# Patient Record
Sex: FEMALE | Race: BLACK OR AFRICAN AMERICAN | NOT HISPANIC OR LATINO | Employment: UNEMPLOYED | URBAN - METROPOLITAN AREA
[De-identification: names, ages, dates, MRNs, and addresses within clinical notes are randomized per-mention and may not be internally consistent; named-entity substitution may affect disease eponyms.]

---

## 2023-08-31 LAB
ABO GROUP (TYPE) IN BLOOD: NORMAL
ALANINE AMINOTRANSFERASE (SGPT) (U/L) IN SER/PLAS: 11 U/L (ref 7–45)
ALBUMIN (G/DL) IN SER/PLAS: 3.6 G/DL (ref 3.4–5)
ALKALINE PHOSPHATASE (U/L) IN SER/PLAS: 71 U/L (ref 33–110)
ANION GAP IN SER/PLAS: 14 MMOL/L (ref 10–20)
ANTIBODY SCREEN: NORMAL
ASPARTATE AMINOTRANSFERASE (SGOT) (U/L) IN SER/PLAS: 12 U/L (ref 9–39)
BILIRUBIN TOTAL (MG/DL) IN SER/PLAS: 0.3 MG/DL (ref 0–1.2)
CALCIUM (MG/DL) IN SER/PLAS: 9.5 MG/DL (ref 8.6–10.6)
CARBON DIOXIDE, TOTAL (MMOL/L) IN SER/PLAS: 24 MMOL/L (ref 21–32)
CHLORIDE (MMOL/L) IN SER/PLAS: 105 MMOL/L (ref 98–107)
CREATININE (MG/DL) IN SER/PLAS: 0.57 MG/DL (ref 0.5–1.05)
ERYTHROCYTE DISTRIBUTION WIDTH (RATIO) BY AUTOMATED COUNT: 16 % (ref 11.5–14.5)
ERYTHROCYTE MEAN CORPUSCULAR HEMOGLOBIN CONCENTRATION (G/DL) BY AUTOMATED: 30.2 G/DL (ref 32–36)
ERYTHROCYTE MEAN CORPUSCULAR VOLUME (FL) BY AUTOMATED COUNT: 83 FL (ref 80–100)
ERYTHROCYTES (10*6/UL) IN BLOOD BY AUTOMATED COUNT: 4.34 X10E12/L (ref 4–5.2)
ESTIMATED AVERAGE GLUCOSE FOR HBA1C: 105 MG/DL
FERRITIN, PREGNANCY: 31 UG/L
FOLATE, SERUM, PREGNANCY: 12.3 NG/ML
GFR FEMALE: >90 ML/MIN/1.73M2
GLUCOSE (MG/DL) IN SER/PLAS: 71 MG/DL (ref 74–99)
HEMATOCRIT (%) IN BLOOD BY AUTOMATED COUNT: 36.1 % (ref 36–46)
HEMOGLOBIN (G/DL) IN BLOOD: 10.9 G/DL (ref 12–16)
HEMOGLOBIN A1C/HEMOGLOBIN TOTAL IN BLOOD: 5.3 %
HEPATITIS B VIRUS SURFACE AG PRESENCE IN SERUM: NONREACTIVE
HEPATITIS C VIRUS AB PRESENCE IN SERUM: NONREACTIVE
HIV 1/ 2 AG/AB SCREEN: NONREACTIVE
IRON (UG/DL) IN SER/PLAS IN PREGNANCY: 45 UG/DL
IRON BINDING CAPACITY (UG/DL) IN PREGNANCY: 405 UG/DL
IRON SATURATION (%) IN PREGNANCY: 11 %
LEUKOCYTES (10*3/UL) IN BLOOD BY AUTOMATED COUNT: 10.5 X10E9/L (ref 4.4–11.3)
NRBC (PER 100 WBCS) BY AUTOMATED COUNT: 0 /100 WBC (ref 0–0)
PLATELETS (10*3/UL) IN BLOOD AUTOMATED COUNT: 312 X10E9/L (ref 150–450)
POTASSIUM (MMOL/L) IN SER/PLAS: 4.3 MMOL/L (ref 3.5–5.3)
PROTEIN TOTAL: 6.6 G/DL (ref 6.4–8.2)
REFLEX ADDED, ANEMIA PANEL: ABNORMAL
RH FACTOR: NORMAL
RUBELLA VIRUS IGG AB: POSITIVE
SODIUM (MMOL/L) IN SER/PLAS: 139 MMOL/L (ref 136–145)
SYPHILIS TOTAL AB: NONREACTIVE
UREA NITROGEN (MG/DL) IN SER/PLAS: 8 MG/DL (ref 6–23)
VITAMIN B12, PREGNANCY: 340 PG/ML

## 2023-09-02 LAB
CHLAMYDIA TRACH., AMPLIFIED: NEGATIVE
N. GONORRHEA, AMPLIFIED: NEGATIVE
URINE CULTURE: ABNORMAL

## 2023-09-07 LAB
CF RESULTS: NORMAL
SMA RESULT: NORMAL

## 2023-09-19 ENCOUNTER — TRANSCRIBE ORDERS (OUTPATIENT)
Dept: OBSTETRICS AND GYNECOLOGY | Facility: CLINIC | Age: 23
End: 2023-09-19
Payer: COMMERCIAL

## 2023-09-19 DIAGNOSIS — Z03.74 SUSPECTED PROBLEM WITH FETAL GROWTH NOT FOUND: Primary | ICD-10-CM

## 2023-09-28 LAB — URINE CULTURE: NORMAL

## 2023-10-26 PROBLEM — D50.9 ANEMIA, IRON DEFICIENCY: Status: ACTIVE | Noted: 2023-10-26

## 2023-10-26 PROBLEM — E55.9 VITAMIN D DEFICIENCY: Status: ACTIVE | Noted: 2023-10-26

## 2023-10-26 PROBLEM — L70.9 ACNE: Status: ACTIVE | Noted: 2023-10-26

## 2023-10-26 PROBLEM — O09.299 HISTORY OF PRE-ECLAMPSIA IN PRIOR PREGNANCY, CURRENTLY PREGNANT (HHS-HCC): Status: ACTIVE | Noted: 2023-10-26

## 2023-10-26 PROBLEM — M25.562 KNEE PAIN, LEFT: Status: ACTIVE | Noted: 2023-10-26

## 2023-10-26 PROBLEM — R10.9 ABDOMINAL PAIN: Status: ACTIVE | Noted: 2023-10-26

## 2023-10-26 PROBLEM — O99.210 OBESITY IN PREGNANCY (HHS-HCC): Status: ACTIVE | Noted: 2023-10-26

## 2023-10-26 PROBLEM — O09.299 HISTORY OF SHOULDER DYSTOCIA IN PRIOR PREGNANCY, CURRENTLY PREGNANT (HHS-HCC): Status: ACTIVE | Noted: 2023-10-26

## 2023-10-26 PROBLEM — K21.9 GERD (GASTROESOPHAGEAL REFLUX DISEASE): Status: ACTIVE | Noted: 2023-10-26

## 2023-10-26 PROBLEM — R73.09 ELEVATED GLUCOSE: Status: ACTIVE | Noted: 2023-10-26

## 2023-10-26 PROBLEM — K59.00 CONSTIPATION: Status: ACTIVE | Noted: 2023-10-26

## 2023-10-26 PROBLEM — F41.1 GENERALIZED ANXIETY DISORDER: Status: ACTIVE | Noted: 2023-10-26

## 2023-10-26 PROBLEM — O09.41 HIGH RISK MULTIGRAVIDA, FIRST TRIMESTER (HHS-HCC): Status: ACTIVE | Noted: 2023-10-26

## 2023-10-26 RX ORDER — TRETINOIN 0.5 MG/G
CREAM TOPICAL NIGHTLY
COMMUNITY
Start: 2022-03-24 | End: 2023-12-05 | Stop reason: HOSPADM

## 2023-10-26 RX ORDER — ASPIRIN 325 MG
1 TABLET, DELAYED RELEASE (ENTERIC COATED) ORAL
COMMUNITY
Start: 2021-03-30 | End: 2023-12-05 | Stop reason: HOSPADM

## 2023-10-26 RX ORDER — BENZOYL PEROXIDE 5 G/100G
1 GEL TOPICAL 2 TIMES DAILY
COMMUNITY
Start: 2022-03-24 | End: 2023-12-05 | Stop reason: HOSPADM

## 2023-10-26 RX ORDER — ONDANSETRON 4 MG/1
4 TABLET, ORALLY DISINTEGRATING ORAL EVERY 6 HOURS PRN
COMMUNITY
End: 2023-12-05 | Stop reason: HOSPADM

## 2023-10-27 ENCOUNTER — APPOINTMENT (OUTPATIENT)
Dept: OBSTETRICS AND GYNECOLOGY | Facility: CLINIC | Age: 23
End: 2023-10-27
Payer: COMMERCIAL

## 2023-10-30 ASSESSMENT — EDINBURGH POSTNATAL DEPRESSION SCALE (EPDS)
I HAVE FELT SCARED OR PANICKY FOR NO GOOD REASON: YES, SOMETIMES
I HAVE FELT SAD OR MISERABLE: NOT VERY OFTEN
I HAVE BLAMED MYSELF UNNECESSARILY WHEN THINGS WENT WRONG: NOT VERY OFTEN
I HAVE BEEN ANXIOUS OR WORRIED FOR NO GOOD REASON: YES, SOMETIMES
THINGS HAVE BEEN GETTING ON TOP OF ME: NO, MOST OF THE TIME I HAVE COPED QUITE WELL
I HAVE BEEN ABLE TO LAUGH AND SEE THE FUNNY SIDE OF THINGS: AS MUCH AS I ALWAYS COULD
I HAVE BEEN SO UNHAPPY THAT I HAVE HAD DIFFICULTY SLEEPING: NOT AT ALL
THE THOUGHT OF HARMING MYSELF HAS OCCURRED TO ME: NEVER
I HAVE BEEN SO UNHAPPY THAT I HAVE BEEN CRYING: ONLY OCCASIONALLY
I HAVE LOOKED FORWARD WITH ENJOYMENT TO THINGS: AS MUCH AS I EVER DID
TOTAL SCORE: 8

## 2023-11-21 ENCOUNTER — APPOINTMENT (OUTPATIENT)
Dept: OBSTETRICS AND GYNECOLOGY | Facility: CLINIC | Age: 23
End: 2023-11-21
Payer: COMMERCIAL

## 2023-12-05 ENCOUNTER — ROUTINE PRENATAL (OUTPATIENT)
Dept: OBSTETRICS AND GYNECOLOGY | Facility: CLINIC | Age: 23
End: 2023-12-05
Payer: COMMERCIAL

## 2023-12-05 VITALS
WEIGHT: 293 LBS | BODY MASS INDEX: 52.78 KG/M2 | HEART RATE: 98 BPM | DIASTOLIC BLOOD PRESSURE: 80 MMHG | SYSTOLIC BLOOD PRESSURE: 117 MMHG

## 2023-12-05 DIAGNOSIS — O09.299 HISTORY OF SHOULDER DYSTOCIA IN PRIOR PREGNANCY, CURRENTLY PREGNANT (HHS-HCC): ICD-10-CM

## 2023-12-05 DIAGNOSIS — O09.299 HISTORY OF PRE-ECLAMPSIA IN PRIOR PREGNANCY, CURRENTLY PREGNANT (HHS-HCC): ICD-10-CM

## 2023-12-05 DIAGNOSIS — Z3A.31 31 WEEKS GESTATION OF PREGNANCY (HHS-HCC): Primary | ICD-10-CM

## 2023-12-05 PROBLEM — U07.1 COVID-19: Status: ACTIVE | Noted: 2021-09-21

## 2023-12-05 PROBLEM — R03.0 ELEVATED BLOOD-PRESSURE READING WITHOUT DIAGNOSIS OF HYPERTENSION: Status: ACTIVE | Noted: 2021-09-21

## 2023-12-05 PROCEDURE — 86780 TREPONEMA PALLIDUM: CPT

## 2023-12-05 PROCEDURE — 82947 ASSAY GLUCOSE BLOOD QUANT: CPT

## 2023-12-05 PROCEDURE — 82607 VITAMIN B-12: CPT

## 2023-12-05 PROCEDURE — 83550 IRON BINDING TEST: CPT

## 2023-12-05 PROCEDURE — 99214 OFFICE O/P EST MOD 30 MIN: CPT | Performed by: ADVANCED PRACTICE MIDWIFE

## 2023-12-05 PROCEDURE — 85027 COMPLETE CBC AUTOMATED: CPT

## 2023-12-05 PROCEDURE — 99214 OFFICE O/P EST MOD 30 MIN: CPT | Mod: TH | Performed by: ADVANCED PRACTICE MIDWIFE

## 2023-12-05 PROCEDURE — 82728 ASSAY OF FERRITIN: CPT

## 2023-12-05 PROCEDURE — 82746 ASSAY OF FOLIC ACID SERUM: CPT

## 2023-12-05 ASSESSMENT — PAIN - FUNCTIONAL ASSESSMENT: PAIN_FUNCTIONAL_ASSESSMENT: 0-10

## 2023-12-05 ASSESSMENT — PAIN SCALES - GENERAL: PAINLEVEL_OUTOF10: 0 - NO PAIN

## 2023-12-05 NOTE — PROGRESS NOTES
Assessment/Plan   Problem List Items Addressed This Visit             ICD-10-CM       Ob-Gyn Problems    History of pre-eclampsia in prior pregnancy, currently pregnant O09.299    Relevant Orders    CBC Anemia Panel With Reflex,Pregnancy    Syphilis Screen with Reflex    Glucose, 1 Hour Screen, Pregnancy    History of shoulder dystocia in prior pregnancy, currently pregnant O09.299     Reviewed etiology of shoulder dystocia  Occurrence in approximately 0.2-3% of vaginal deliveries  Discussed it is difficult to predict who may have a shoulder dystocia, however risk factors include:  diabetes in pregnancy (babies are more likely to have macrosomia; show a pattern of greater shoulder/chest/abdominal growth)  fetal macrosomia (3-13% of newborns weighing >4000g)  excessive weight gain in pregnancy (associated with increased rates of fetal macrosomia)  prior history of shoulder dystocia (10-12% recurrence rate)  Discussed maternal and  risks reviewed including: lacerations, PS separation,  brachial plexus injury, fractures to humerus or clavicle, HIE, rarely  death (1 in 25,000 deliveries)  Reviewed weight gain (30); 1hr glucose (pending)  Discussed options/risks/benefits of pLTCS vs. vaginal delivery  Patient acknowledges understanding          Relevant Orders    CBC Anemia Panel With Reflex,Pregnancy    Syphilis Screen with Reflex    Glucose, 1 Hour Screen, Pregnancy     Other Visit Diagnoses         Codes    31 weeks gestation of pregnancy    -  Primary Z3A.31    Relevant Orders    CBC Anemia Panel With Reflex,Pregnancy    Syphilis Screen with Reflex    Glucose, 1 Hour Screen, Pregnancy             surveillance weekly for 34wk  Needs to schedule growth US ASAP  Extensively counseled on risk of shoulder with previous LGA baby  -patient unsure would benefit from counseling again    -discussed MD care only due to BMI    Reviewed s/sx of PTL labor, warning signs, fetal movement counts, and  "when to call provider  Follow up in 2 week for a routine prenatal visit.    Laquita Avalos, LUIS MANUEL-ALLEN    Subjective     Darcie Monroe is a 23 y.o.  at 31w4d with a working estimated date of delivery of 2024, by Last Menstrual Period who presents for a routine prenatal visit. She denies vaginal bleeding, leakage of fluid, decreased fetal movements, or contractions.    Her pregnancy is complicated by:  Pregnancy Problems (from 24 to present)       No problems associated with this episode.             Objective   Physical Exam:   Weight: 148 kg (327 lb)  Expected Total Weight Gain: Could not be calculated   Pregravid BMI: Could not be calculated  BP: 117/80  Fetal Heart Rate: 145               Postpartum Depression: Medium Risk (10/30/2023)    Gay  Depression Scale     Last EPDS Total Score: 8     Last EPDS Self Harm Result: Never        Prenatal Labs  Lab Results   Component Value Date    HGB 10.9 (L) 2023    HCT 36.1 2023    ABO A 2023    HEPBSAG NONREACTIVE 2023     No results found for: \"GLUF\", \"GLUT1\", \"LESONMY9QE\", \"FLGMPAV8DH\"  No results found for: \"GBS\"     Imaging  The most recent ultrasound was performed on The most recent ultrasound study is not finalized with a study GA of The most recent ultrasound study is not finalized and EFW of The most recent ultrasound study is not finalized.  The most recent ultrasound study is not finalized  The most recent ultrasound study is not finalized  "

## 2023-12-05 NOTE — ASSESSMENT & PLAN NOTE
Reviewed etiology of shoulder dystocia  Occurrence in approximately 0.2-3% of vaginal deliveries  Discussed it is difficult to predict who may have a shoulder dystocia, however risk factors include:  diabetes in pregnancy (babies are more likely to have macrosomia; show a pattern of greater shoulder/chest/abdominal growth)  fetal macrosomia (3-13% of newborns weighing >4000g)  excessive weight gain in pregnancy (associated with increased rates of fetal macrosomia)  prior history of shoulder dystocia (10-12% recurrence rate)  Discussed maternal and  risks reviewed including: lacerations, PS separation,  brachial plexus injury, fractures to humerus or clavicle, HIE, rarely  death (1 in 25,000 deliveries)  Reviewed weight gain (30); 1hr glucose (pending)  Discussed options/risks/benefits of pLTCS vs. vaginal delivery  Patient acknowledges understanding

## 2023-12-06 ENCOUNTER — TELEPHONE (OUTPATIENT)
Dept: PEDIATRICS | Facility: CLINIC | Age: 23
End: 2023-12-06
Payer: COMMERCIAL

## 2023-12-06 DIAGNOSIS — R73.09 ELEVATED GLUCOSE LEVEL: ICD-10-CM

## 2023-12-06 DIAGNOSIS — O99.013 ANEMIA IN PREGNANCY, THIRD TRIMESTER (HHS-HCC): Primary | ICD-10-CM

## 2023-12-06 RX ORDER — FERROUS SULFATE 325(65) MG
325 TABLET ORAL 2 TIMES DAILY
Qty: 30 TABLET | Refills: 2 | Status: SHIPPED | OUTPATIENT
Start: 2023-12-06 | End: 2024-12-05

## 2023-12-06 NOTE — TELEPHONE ENCOUNTER
SW received referral from Laquita Avalos CNM to that pt needs resources for transportation. SW called pt, Darcie Monroe ([phone]) and introduced self and explained reason for today's call. Pt confirmed transportation is a barrier. SW will call a [employer] to bring pt to next appointment on 12/19/23 for ultrasound and OBGYN. SW explained Provide A Ride through pt's insurance [employer] and how she can schedule transportation for her future appointments. SW assessed for additional needs. Pt familiar with [employer] and [employer] provided contact info to inquire about baby items, pack n play, and a car seat. Pt shared she was not working and her utility bill balance is high. SW provided contact info for [employer] and [employer]. No other SW needs at this time. [employer] will call pt on 12/19/23 before calling [employer] to confirm. SW contact info was provided if additional needs arise.

## 2023-12-19 ENCOUNTER — APPOINTMENT (OUTPATIENT)
Dept: OBSTETRICS AND GYNECOLOGY | Facility: CLINIC | Age: 23
End: 2023-12-19
Payer: COMMERCIAL

## 2023-12-19 ENCOUNTER — ANCILLARY PROCEDURE (OUTPATIENT)
Dept: RADIOLOGY | Facility: CLINIC | Age: 23
End: 2023-12-19
Payer: COMMERCIAL

## 2023-12-19 ENCOUNTER — ROUTINE PRENATAL (OUTPATIENT)
Dept: OBSTETRICS AND GYNECOLOGY | Facility: CLINIC | Age: 23
End: 2023-12-19
Payer: COMMERCIAL

## 2023-12-19 VITALS — DIASTOLIC BLOOD PRESSURE: 84 MMHG | WEIGHT: 293 LBS | BODY MASS INDEX: 52.59 KG/M2 | SYSTOLIC BLOOD PRESSURE: 127 MMHG

## 2023-12-19 DIAGNOSIS — Z03.74 SUSPECTED PROBLEM WITH FETAL GROWTH NOT FOUND: ICD-10-CM

## 2023-12-19 DIAGNOSIS — O99.213 OBESITY COMPLICATING PREGNANCY, THIRD TRIMESTER (HHS-HCC): ICD-10-CM

## 2023-12-19 DIAGNOSIS — Z3A.33 33 WEEKS GESTATION OF PREGNANCY (HHS-HCC): ICD-10-CM

## 2023-12-19 DIAGNOSIS — O99.210 OBESITY IN PREGNANCY (HHS-HCC): ICD-10-CM

## 2023-12-19 DIAGNOSIS — O09.43 HIGH RISK MULTIGRAVIDA IN THIRD TRIMESTER (HHS-HCC): ICD-10-CM

## 2023-12-19 DIAGNOSIS — O99.013 ANEMIA DURING PREGNANCY IN THIRD TRIMESTER (HHS-HCC): ICD-10-CM

## 2023-12-19 DIAGNOSIS — R73.09 ELEVATED GLUCOSE LEVEL: ICD-10-CM

## 2023-12-19 DIAGNOSIS — K21.9 GASTROESOPHAGEAL REFLUX DISEASE, UNSPECIFIED WHETHER ESOPHAGITIS PRESENT: Primary | ICD-10-CM

## 2023-12-19 PROCEDURE — 76819 FETAL BIOPHYS PROFIL W/O NST: CPT

## 2023-12-19 PROCEDURE — 76819 FETAL BIOPHYS PROFIL W/O NST: CPT | Performed by: OBSTETRICS & GYNECOLOGY

## 2023-12-19 PROCEDURE — 90715 TDAP VACCINE 7 YRS/> IM: CPT | Mod: GC

## 2023-12-19 PROCEDURE — 99213 OFFICE O/P EST LOW 20 MIN: CPT | Mod: GC,TH

## 2023-12-19 PROCEDURE — 76816 OB US FOLLOW-UP PER FETUS: CPT

## 2023-12-19 PROCEDURE — 76816 OB US FOLLOW-UP PER FETUS: CPT | Performed by: OBSTETRICS & GYNECOLOGY

## 2023-12-19 PROCEDURE — 99213 OFFICE O/P EST LOW 20 MIN: CPT

## 2023-12-19 RX ORDER — FAMOTIDINE 20 MG/1
20 TABLET, FILM COATED ORAL 2 TIMES DAILY
Qty: 90 TABLET | Refills: 2 | Status: SHIPPED | OUTPATIENT
Start: 2023-12-19 | End: 2024-04-24 | Stop reason: WASHOUT

## 2023-12-19 NOTE — ASSESSMENT & PLAN NOTE
Not taking iron. Discussed taking iron every other day. Patient expressed understanding. Repeat CBC at next visit

## 2023-12-19 NOTE — PROGRESS NOTES
Ob Follow-up  23     SUBJECTIVE      HPI: Darcie Monroe is a 23 y.o.  at 33w4d here for RPNV.  She has mild contractions, no bleeding, or no LOF. Reports normal fetal movement. Patient reports some dizziness.       OBJECTIVE  Visit Vitals  /84   Wt 148 kg (325 lb 12.8 oz)   LMP 2023   BMI 52.59 kg/m²   OB Status Pregnant   Smoking Status Never   BSA 2.62 m²      FHT: US today      ASSESSMENT & PLAN    Darcie Monroe is a 23 y.o.  at 33w4d here for the following concerns we addressed today:    Problem List Items Addressed This Visit          Pregnancy    33 weeks gestation of pregnancy    Anemia during pregnancy in third trimester    Overview     Hgb 10.1  Iron Rxed         Current Assessment & Plan     Not taking iron. Discussed taking iron every other day. Patient expressed understanding. Repeat CBC at next visit         Elevated glucose level    Overview     1hr 145-   3hr ordered          Current Assessment & Plan     Discussed recommendation for 3 hr. Ordered. Patient expressed understanding         GERD (gastroesophageal reflux disease) - Primary    Current Assessment & Plan     Feels omeprazole not working, pepcid rxed         Relevant Medications    famotidine (Pepcid) 20 mg tablet    High risk multigravida, first trimester    Overview     Dating:   Baby girl [place]  [] Initial BMI:   [x] Prenatal Labs:   [] Aneuploidy Screening:    [x] Baby ASA:  [x] Anatomy US:  [x] 1hr GCT at 24-28wks: elevated   [] 3 hr - ordered, not done  [x] Tdap (27-36wks):   [x] Flu Shot: declined   [x] COVID vaccine: declined   [x] Rhogam (if Rh neg):  not inidicated  [] GBS at 36 wks:  [] Breastfeeding  [] PPBC:   [] 39 weeks discussion of IOL vs. Expectant management: hx of shoulder dystocia, 9#6oz, *discuss risks/method of delivery at 35wk visit*  [] Mode of delivery:          Current Assessment & Plan     3 hr ordered- patient to go to lab over next week. Tdap today. Declined  flu/covid  Otherwise up to date         Obesity in pregnancy    Overview     MD CARE ONLY          Current Assessment & Plan     Growth US today. Repeat at 36 weeks              Orders Placed This Encounter   Procedures    Tdap vaccine, age 7 years and older  (BOOSTRIX)        RTC in 2 weeks  Discussed with Dr. Loraine Gonzalez MD

## 2023-12-19 NOTE — ASSESSMENT & PLAN NOTE
3 hr ordered- patient to go to lab over next week. Tdap today. Declined flu/covid  Otherwise up to date

## 2023-12-19 NOTE — ASSESSMENT & PLAN NOTE
3 hr ordered- patient to go to lab over next week. Tdap today. Declined flu/covid  Otherwise up to date.

## 2023-12-21 ENCOUNTER — TELEPHONE (OUTPATIENT)
Dept: OBSTETRICS AND GYNECOLOGY | Facility: HOSPITAL | Age: 23
End: 2023-12-21
Payer: COMMERCIAL

## 2023-12-26 ENCOUNTER — TELEPHONE (OUTPATIENT)
Dept: OBSTETRICS AND GYNECOLOGY | Facility: HOSPITAL | Age: 23
End: 2023-12-26
Payer: COMMERCIAL

## 2023-12-29 ENCOUNTER — HOSPITAL ENCOUNTER (OUTPATIENT)
Facility: HOSPITAL | Age: 23
End: 2023-12-29
Attending: OBSTETRICS & GYNECOLOGY | Admitting: OBSTETRICS & GYNECOLOGY
Payer: COMMERCIAL

## 2023-12-29 ENCOUNTER — HOSPITAL ENCOUNTER (OUTPATIENT)
Facility: HOSPITAL | Age: 23
Discharge: HOME | End: 2023-12-29
Attending: OBSTETRICS & GYNECOLOGY | Admitting: OBSTETRICS & GYNECOLOGY
Payer: COMMERCIAL

## 2023-12-29 VITALS
BODY MASS INDEX: 48.82 KG/M2 | DIASTOLIC BLOOD PRESSURE: 74 MMHG | OXYGEN SATURATION: 100 % | TEMPERATURE: 97.9 F | SYSTOLIC BLOOD PRESSURE: 127 MMHG | RESPIRATION RATE: 18 BRPM | HEART RATE: 94 BPM | WEIGHT: 293 LBS | HEIGHT: 65 IN

## 2023-12-29 LAB
ALBUMIN SERPL BCP-MCNC: 3.4 G/DL (ref 3.4–5)
ALP SERPL-CCNC: 154 U/L (ref 33–110)
ALT SERPL W P-5'-P-CCNC: 11 U/L (ref 7–45)
ANION GAP SERPL CALC-SCNC: 15 MMOL/L (ref 10–20)
AST SERPL W P-5'-P-CCNC: 12 U/L (ref 9–39)
BILIRUB SERPL-MCNC: 0.3 MG/DL (ref 0–1.2)
BUN SERPL-MCNC: 6 MG/DL (ref 6–23)
CALCIUM SERPL-MCNC: 8.9 MG/DL (ref 8.6–10.6)
CHLORIDE SERPL-SCNC: 108 MMOL/L (ref 98–107)
CO2 SERPL-SCNC: 20 MMOL/L (ref 21–32)
CREAT SERPL-MCNC: 0.51 MG/DL (ref 0.5–1.05)
CREAT UR-MCNC: 111.9 MG/DL (ref 20–320)
ERYTHROCYTE [DISTWIDTH] IN BLOOD BY AUTOMATED COUNT: 14.7 % (ref 11.5–14.5)
GFR SERPL CREATININE-BSD FRML MDRD: >90 ML/MIN/1.73M*2
GLUCOSE BLD MANUAL STRIP-MCNC: 111 MG/DL (ref 74–99)
GLUCOSE SERPL-MCNC: 95 MG/DL (ref 74–99)
HCT VFR BLD AUTO: 32.6 % (ref 36–46)
HGB BLD-MCNC: 9.9 G/DL (ref 12–16)
MCH RBC QN AUTO: 24.1 PG (ref 26–34)
MCHC RBC AUTO-ENTMCNC: 30.4 G/DL (ref 32–36)
MCV RBC AUTO: 79 FL (ref 80–100)
NRBC BLD-RTO: 0 /100 WBCS (ref 0–0)
PLATELET # BLD AUTO: 305 X10*3/UL (ref 150–450)
POTASSIUM SERPL-SCNC: 4.2 MMOL/L (ref 3.5–5.3)
PROT SERPL-MCNC: 6.3 G/DL (ref 6.4–8.2)
PROT UR-ACNC: 16 MG/DL (ref 5–24)
PROT/CREAT UR: 0.14 MG/MG CREAT (ref 0–0.17)
RBC # BLD AUTO: 4.11 X10*6/UL (ref 4–5.2)
SODIUM SERPL-SCNC: 139 MMOL/L (ref 136–145)
WBC # BLD AUTO: 12.8 X10*3/UL (ref 4.4–11.3)

## 2023-12-29 PROCEDURE — 36415 COLL VENOUS BLD VENIPUNCTURE: CPT | Mod: 59

## 2023-12-29 PROCEDURE — 99214 OFFICE O/P EST MOD 30 MIN: CPT | Mod: 25

## 2023-12-29 PROCEDURE — 82570 ASSAY OF URINE CREATININE: CPT | Performed by: STUDENT IN AN ORGANIZED HEALTH CARE EDUCATION/TRAINING PROGRAM

## 2023-12-29 PROCEDURE — 99213 OFFICE O/P EST LOW 20 MIN: CPT | Performed by: STUDENT IN AN ORGANIZED HEALTH CARE EDUCATION/TRAINING PROGRAM

## 2023-12-29 PROCEDURE — 82947 ASSAY GLUCOSE BLOOD QUANT: CPT | Mod: 59

## 2023-12-29 PROCEDURE — 85027 COMPLETE CBC AUTOMATED: CPT | Performed by: STUDENT IN AN ORGANIZED HEALTH CARE EDUCATION/TRAINING PROGRAM

## 2023-12-29 PROCEDURE — 80053 COMPREHEN METABOLIC PANEL: CPT | Performed by: STUDENT IN AN ORGANIZED HEALTH CARE EDUCATION/TRAINING PROGRAM

## 2023-12-29 PROCEDURE — 36415 COLL VENOUS BLD VENIPUNCTURE: CPT | Performed by: STUDENT IN AN ORGANIZED HEALTH CARE EDUCATION/TRAINING PROGRAM

## 2023-12-29 RX ORDER — NIFEDIPINE 10 MG/1
10 CAPSULE ORAL ONCE AS NEEDED
Status: DISCONTINUED | OUTPATIENT
Start: 2023-12-29 | End: 2023-12-30 | Stop reason: HOSPADM

## 2023-12-29 RX ORDER — ONDANSETRON HYDROCHLORIDE 2 MG/ML
4 INJECTION, SOLUTION INTRAVENOUS EVERY 6 HOURS PRN
Status: DISCONTINUED | OUTPATIENT
Start: 2023-12-29 | End: 2023-12-30 | Stop reason: HOSPADM

## 2023-12-29 RX ORDER — ONDANSETRON 4 MG/1
4 TABLET, FILM COATED ORAL EVERY 6 HOURS PRN
Status: DISCONTINUED | OUTPATIENT
Start: 2023-12-29 | End: 2023-12-30 | Stop reason: HOSPADM

## 2023-12-29 RX ORDER — LABETALOL HYDROCHLORIDE 5 MG/ML
20 INJECTION, SOLUTION INTRAVENOUS ONCE AS NEEDED
Status: DISCONTINUED | OUTPATIENT
Start: 2023-12-29 | End: 2023-12-30 | Stop reason: HOSPADM

## 2023-12-29 RX ORDER — HYDRALAZINE HYDROCHLORIDE 20 MG/ML
5 INJECTION INTRAMUSCULAR; INTRAVENOUS ONCE AS NEEDED
Status: DISCONTINUED | OUTPATIENT
Start: 2023-12-29 | End: 2023-12-30 | Stop reason: HOSPADM

## 2023-12-29 RX ORDER — LIDOCAINE HYDROCHLORIDE 10 MG/ML
0.5 INJECTION INFILTRATION; PERINEURAL ONCE AS NEEDED
Status: DISCONTINUED | OUTPATIENT
Start: 2023-12-29 | End: 2023-12-30 | Stop reason: HOSPADM

## 2023-12-29 SDOH — SOCIAL STABILITY: SOCIAL INSECURITY: ARE THERE ANY APPARENT SIGNS OF INJURIES/BEHAVIORS THAT COULD BE RELATED TO ABUSE/NEGLECT?: NO

## 2023-12-29 SDOH — ECONOMIC STABILITY: HOUSING INSECURITY: DO YOU FEEL UNSAFE GOING BACK TO THE PLACE WHERE YOU ARE LIVING?: NO

## 2023-12-29 SDOH — HEALTH STABILITY: MENTAL HEALTH: NON-SPECIFIC ACTIVE SUICIDAL THOUGHTS (PAST 1 MONTH): NO

## 2023-12-29 SDOH — HEALTH STABILITY: MENTAL HEALTH: SUICIDAL BEHAVIOR (LIFETIME): NO

## 2023-12-29 SDOH — SOCIAL STABILITY: SOCIAL INSECURITY: PHYSICAL ABUSE: DENIES

## 2023-12-29 SDOH — SOCIAL STABILITY: SOCIAL INSECURITY: ABUSE SCREEN: ADULT

## 2023-12-29 SDOH — SOCIAL STABILITY: SOCIAL INSECURITY: DO YOU FEEL ANYONE HAS EXPLOITED OR TAKEN ADVANTAGE OF YOU FINANCIALLY OR OF YOUR PERSONAL PROPERTY?: NO

## 2023-12-29 SDOH — SOCIAL STABILITY: SOCIAL INSECURITY: HAS ANYONE EVER THREATENED TO HURT YOUR FAMILY OR YOUR PETS?: NO

## 2023-12-29 SDOH — SOCIAL STABILITY: SOCIAL INSECURITY: ARE YOU OR HAVE YOU BEEN THREATENED OR ABUSED PHYSICALLY, EMOTIONALLY, OR SEXUALLY BY ANYONE?: NO

## 2023-12-29 SDOH — HEALTH STABILITY: MENTAL HEALTH: WISH TO BE DEAD (PAST 1 MONTH): NO

## 2023-12-29 SDOH — SOCIAL STABILITY: SOCIAL INSECURITY: HAVE YOU HAD THOUGHTS OF HARMING ANYONE ELSE?: NO

## 2023-12-29 SDOH — HEALTH STABILITY: MENTAL HEALTH: WERE YOU ABLE TO COMPLETE ALL THE BEHAVIORAL HEALTH SCREENINGS?: YES

## 2023-12-29 SDOH — SOCIAL STABILITY: SOCIAL INSECURITY: VERBAL ABUSE: DENIES

## 2023-12-29 SDOH — SOCIAL STABILITY: SOCIAL INSECURITY: DOES ANYONE TRY TO KEEP YOU FROM HAVING/CONTACTING OTHER FRIENDS OR DOING THINGS OUTSIDE YOUR HOME?: NO

## 2023-12-29 ASSESSMENT — LIFESTYLE VARIABLES
AUDIT-C TOTAL SCORE: 0
AUDIT-C TOTAL SCORE: 0
SKIP TO QUESTIONS 9-10: 1
HOW MANY STANDARD DRINKS CONTAINING ALCOHOL DO YOU HAVE ON A TYPICAL DAY: PATIENT DOES NOT DRINK
HOW OFTEN DO YOU HAVE 6 OR MORE DRINKS ON ONE OCCASION: NEVER
HOW OFTEN DO YOU HAVE A DRINK CONTAINING ALCOHOL: NEVER

## 2023-12-29 ASSESSMENT — PATIENT HEALTH QUESTIONNAIRE - PHQ9
SUM OF ALL RESPONSES TO PHQ9 QUESTIONS 1 & 2: 0
1. LITTLE INTEREST OR PLEASURE IN DOING THINGS: NOT AT ALL
2. FEELING DOWN, DEPRESSED OR HOPELESS: NOT AT ALL

## 2023-12-29 ASSESSMENT — COLUMBIA-SUICIDE SEVERITY RATING SCALE - C-SSRS
6. HAVE YOU EVER DONE ANYTHING, STARTED TO DO ANYTHING, OR PREPARED TO DO ANYTHING TO END YOUR LIFE?: NO
1. IN THE PAST MONTH, HAVE YOU WISHED YOU WERE DEAD OR WISHED YOU COULD GO TO SLEEP AND NOT WAKE UP?: NO
2. HAVE YOU ACTUALLY HAD ANY THOUGHTS OF KILLING YOURSELF?: NO

## 2023-12-29 ASSESSMENT — PAIN SCALES - GENERAL: PAINLEVEL_OUTOF10: 3

## 2023-12-30 NOTE — H&P
Obstetrical Admission History and Physical    Assessment/Plan    Darcie Monroe is a 23 y.o.  at 35w0d who presents for elevated Bps at home, decreased fetal movement, and pelvic pressure.    R/o PEC  - Bps cycled and all normotensive   - Discussed RN visit in the next week to calibrate home cuff  - Asymptomatic, PEC labs neg, P:C 0.14    Pelvic pressure  - Closed/long/high  - [place] quiet    Decreased fetal movement  - Improved in triage  - NST reactive and reassuring  - Bedside US FRANCI: 15, MVP 4.5    Dispo: home with precautions    Dalia Mills MD  Seen and d/w Dr. Byers    Subjective   24 yo  at 35.0 who presents for elevated Bps at home, decreased fetal movement, and pelvic pressure.    Monitoring Bps at home due to history of PEC and over last few days have been rising 150-160s. No headache, changes in vision, RUQ pain. Also noticing some progressing pelvic pressure over the last week and cramping. No LOF, VB. Fetal movement decreased x 2 days. Improved in triage to baseline.     Pregnancy c/b:  - h/o PEC  - h/o shoulder dystocia of 9 lb 6 oz baby  - Class III obesity  - abnormal 1hr, no 3 hr gtt yet  - iron deficiency anemia    Obstetrical History   OB History    Para Term  AB Living   2 1 1 0 0 1   SAB IAB Ectopic Multiple Live Births   0 0 0 0 1      # Outcome Date GA Lbr Marcelino/2nd Weight Sex Delivery Anes PTL Lv   2 Current            1 Term 20 39w1d  4252 g M Vag-Spont EPI N LUIS ANTONIO      Obstetric Comments   2023 1:19 PM - SM  Order for Compression stockings sent to [facility] Byron Chapa RN        2023 3:27 PM - MS  rx for breast pump sent to [facility]       2023 12:09 PM - SM  [facility]        Past Medical History  Past Medical History:   Diagnosis Date    11 weeks gestation of pregnancy 10/14/2019    11 weeks gestation of pregnancy    Abnormal findings on diagnostic imaging of heart and  coronary circulation 06/28/2018    Abnormal echocardiogram    Acanthosis nigricans 08/24/2017    Acanthosis nigricans    Acute candidiasis of vulva and vagina 06/07/2017    Vaginitis due to Candida    Acute candidiasis of vulva and vagina 09/23/2019    Vaginal candida    Acute vaginitis 09/23/2019    Bacterial vaginosis    Anemia complicating pregnancy, third trimester 03/23/2020    Anemia of pregnancy in third trimester    Body mass index (BMI) 50.0-59.9, adult (CMS/[facility]) 10/14/2019    Body mass index (BMI) of 50.0 to 59.9 in adult    Body mass index (BMI) pediatric, greater than or equal to 95th percentile for age 06/25/2018    BMI (body mass index), pediatric, greater than or equal to 95% for age    Cannabis use, unspecified, uncomplicated 02/04/2016    Marijuana smoker    Cannabis use, unspecified, uncomplicated 02/04/2016    Marijuana smoker    Chlamydial infection, unspecified 01/14/2016    Chlamydial infection    Conduct disorder, unspecified     Disruptive behavior disorder    Constipation during pregnancy 08/31/2023    Dr Higgins    Encounter for contraceptive management, unspecified 06/30/2017    Contraception management    Encounter for gynecological examination (general) (routine) without abnormal findings 03/19/2021    Well woman exam    Encounter for immunization 01/14/2016    Immunization due    Encounter for immunization 10/09/2018    Immunization due    Encounter for pregnancy test, result positive 09/23/2019    Positive urine pregnancy test    Encounter for routine child health examination with abnormal findings 09/12/2019    Encounter for well adolescent visit with abnormal findings    Encounter for routine child health examination with abnormal findings 06/25/2018    Encounter for routine child health examination with abnormal findings    Encounter for screening for infections with a predominantly sexual mode of transmission 06/25/2018    Routine screening for STI (sexually transmitted infection)     GERD (gastroesophageal reflux disease) 08/31/2023    Dr Higgins    Heart disease, unspecified 06/28/2018    Left ventricular dysfunction    High risk heterosexual behavior 01/14/2016    Sexually active child    History of pre-eclampsia     Iron deficiency anemia during pregnancy 08/31/2023    Dr Higgins    Irregular menstruation, unspecified 09/13/2019    Missed menses    Localized edema 06/25/2018    Edema extremities    Migraine, unspecified, not intractable, without status migrainosus 11/19/2018    Headache, migraine    Morbid (severe) obesity due to excess calories (CMS/[facility]) 01/14/2016    Morbid obesity    Morbid (severe) obesity due to excess calories (CMS/[facility]) 09/13/2019    Obesity, Class III, BMI 40-49.9 (morbid obesity)    Other conditions influencing health status 09/12/2019    History of pregnancy    Other conditions influencing health status 09/12/2019    History of pregnancy    Other fracture of unspecified lower leg, initial encounter for closed fracture 12/30/2013    Closed fracture of ankle    Other ill-defined heart diseases 06/28/2018    Familial heart disease    Other specified abnormal findings of blood chemistry 10/09/2018    Elevated TSH    Other specified pregnancy related conditions, unspecified trimester 01/09/2020    Heartburn in pregnancy    Other specified soft tissue disorders 06/21/2018    Limb swelling    Other symptoms and signs involving the nervous system 03/08/2018    Suspected sleep apnea    Patellofemoral disorders, unspecified knee 11/14/2017    Patellofemoral syndrome    Personal history of diseases of the blood and blood-forming organs and certain disorders involving the immune mechanism 06/25/2018    History of anemia    Personal history of nicotine dependence 02/04/2016    History of tobacco abuse    Personal history of other benign neoplasm 08/24/2017    History of lymphangioma    Personal history of other diseases of the female genital tract 05/17/2017    History of acute  pelvic inflammatory disease    Personal history of other diseases of the female genital tract 08/11/2017    History of vaginal discharge    Personal history of other diseases of the female genital tract 02/09/2015    History of irregular menstrual cycles    Personal history of other diseases of the female genital tract 10/09/2018    History of abnormal uterine bleeding    Personal history of other diseases of the musculoskeletal system and connective tissue 11/19/2018    History of neck pain    Personal history of other diseases of the musculoskeletal system and connective tissue 11/14/2017    History of genu valgum    Personal history of other diseases of the nervous system and sense organs 11/28/2018    History of Bell's palsy    Personal history of other diseases of the nervous system and sense organs 11/28/2018    History of eye pain    Personal history of other diseases of the nervous system and sense organs 11/28/2018    History of Bell's palsy    Personal history of other diseases of the respiratory system 09/12/2017    History of sore throat    Personal history of other drug therapy 10/14/2019    History of influenza vaccination    Personal history of other endocrine, nutritional and metabolic disease 06/28/2018    History of morbid obesity    Personal history of other endocrine, nutritional and metabolic disease 06/07/2017    History of vitamin D deficiency    Personal history of other endocrine, nutritional and metabolic disease 03/01/2016    History of insulin resistance    Personal history of other endocrine, nutritional and metabolic disease 09/12/2019    History of obesity    Personal history of other mental and behavioral disorders 01/18/2019    History of attention deficit hyperactivity disorder (ADHD)    Personal history of other specified conditions 11/28/2018    History of headache    Personal history of transient ischemic attack (TIA), and cerebral infarction without residual deficits     History  of cerebrovascular accident    Reaction to severe stress, unspecified 01/18/2019    Trauma and stressor-related disorder    Secondary amenorrhea 11/28/2018    Amenorrhea, secondary    Supervision of other high risk pregnancies, unspecified trimester 12/12/2019    High risk teen pregnancy, antepartum    Tobacco use 02/04/2016    Tobacco abuse    Trichomoniasis, unspecified 05/17/2017    Trichomonas vaginalis infection    Unspecified infection of urinary tract in pregnancy, first trimester 10/14/2019    Urinary tract infection in mother during first trimester of pregnancy    Unspecified infection of urinary tract in pregnancy, unspecified trimester 12/12/2019    UTI in pregnancy    Vitamin D deficiency, unspecified 03/30/2021    Vitamin D deficiency        Past Surgical History   Past Surgical History:   Procedure Laterality Date    ABDOMINAL SURGERY  2017    MR HEAD ANGIO W AND WO IV CONTRAST  11/10/2018    MR HEAD ANGIO W AND WO IV CONTRAST 11/10/2018 [facility]    MR NECK ANGIO WO IV CONTRAST  11/10/2018    MR NECK ANGIO WO IV CONTRAST 11/10/2018 [facility]    OTHER SURGICAL HISTORY  02/09/2015    Tonsillectomy Over Age 13       Social History  Social History     Tobacco Use    Smoking status: Never    Smokeless tobacco: Never   Substance Use Topics    Alcohol use: Never     Substance and Sexual Activity   Drug Use Never       Allergies  Patient has no known allergies.     Medications  Medications Prior to Admission   Medication Sig Dispense Refill Last Dose    aspirin 81 mg chewable tablet CHEW 2 TABLETS BY MOUTH ONCE DAILY 60 tablet 6     famotidine (Pepcid) 20 mg tablet Take 1 tablet (20 mg) by mouth 2 times a day. 90 tablet 2     ferrous sulfate, 325 mg ferrous sulfate, tablet Take 1 tablet by mouth 2 times a day. 30 tablet 2     polyethylene glycol (Glycolax, Miralax) 17 gram/dose powder MIX 1 CAPFUL (17GM) IN 8 OUNCES OF WATER, JUICE, OR TEA AND DRINK DAILY. 510 g 3     prenatal vitamin,  iron-folic, 27 mg iron-800 mcg folic acid tablet TAKE 1 TABLET DAILY. 30 tablet 4     pyridoxine (Vitamin B-6) 25 mg tablet TAKE 1 TABLET 4 TIMES DAILY WITH UNISOM 30 tablet 3        Objective    Last Vitals  Temp Pulse Resp BP MAP O2 Sat   36.6 °C (97.9 °F) 94 18 127/74   100 %     Physical Examination  General: no acute distress  HEENT: normocephalic, atraumatic  Heart: warm and well perfused  Lungs: breathing comfortably on room air  Abdomen: gravid  Extremities: moving all extremities  Neuro: awake and conversant  Psych: appropriate mood and affect    Cervical Exam  Dilation: Closed  Effacement (%): 0  Fetal Station: Floating  OB Examiner: nicholas  Fetal Assessment  Movement: (!) Decreased  Mode: External US  Baseline Fetal Heart Rate (bpm): 152 bpm  Baseline Classification: Normal  Variability: Moderate (Between 6 and 25 BPM)  Pattern: Accelerations, Variable decelerations  FHR Category: Category I  Multiple Births: No     NST reactive

## 2023-12-30 NOTE — ED TRIAGE NOTES
Pt to ED c/o HTN since Tues, pt states she usually runs 115's systolic and her pressures have been in the 160's systolic for the past few days. Pt denies any headaches, just endorsing dizziness. Pt also states she has not felt her baby move in 2 days and is concerned. Pt also endorsing cramping abd pain. Denies any nausea or vomiting.

## 2024-01-03 ENCOUNTER — APPOINTMENT (OUTPATIENT)
Dept: OBSTETRICS AND GYNECOLOGY | Facility: CLINIC | Age: 24
End: 2024-01-03
Payer: COMMERCIAL

## 2024-01-04 ENCOUNTER — DOCUMENTATION (OUTPATIENT)
Dept: OBSTETRICS AND GYNECOLOGY | Facility: CLINIC | Age: 24
End: 2024-01-04
Payer: COMMERCIAL

## 2024-01-04 ENCOUNTER — APPOINTMENT (OUTPATIENT)
Dept: OBSTETRICS AND GYNECOLOGY | Facility: CLINIC | Age: 24
End: 2024-01-04
Payer: COMMERCIAL

## 2024-01-04 NOTE — PROGRESS NOTES
Dalia Mills MD  [id number] Clinical Support Staff  Hi! Can someone contact this patient and help her schedule a nursing visit for a BP check and BP cuff calibration in the next week or so? Thank you!          Comments    Pt scheduled for B/p ck 1/3/24 states she is going to do her 3 hr also CODEY Chapa.   1/4/24 Pt cancelled apt Has apt with Dr Gonzalez 1/11/24

## 2024-01-11 ENCOUNTER — ANCILLARY PROCEDURE (OUTPATIENT)
Dept: RADIOLOGY | Facility: CLINIC | Age: 24
End: 2024-01-11
Payer: COMMERCIAL

## 2024-01-11 ENCOUNTER — PHARMACY VISIT (OUTPATIENT)
Dept: PHARMACY | Facility: CLINIC | Age: 24
End: 2024-01-11
Payer: MEDICAID

## 2024-01-11 ENCOUNTER — ROUTINE PRENATAL (OUTPATIENT)
Dept: OBSTETRICS AND GYNECOLOGY | Facility: CLINIC | Age: 24
End: 2024-01-11
Payer: COMMERCIAL

## 2024-01-11 VITALS
DIASTOLIC BLOOD PRESSURE: 83 MMHG | HEART RATE: 99 BPM | WEIGHT: 293 LBS | BODY MASS INDEX: 54.91 KG/M2 | SYSTOLIC BLOOD PRESSURE: 124 MMHG

## 2024-01-11 DIAGNOSIS — O09.43 HIGH RISK MULTIGRAVIDA IN THIRD TRIMESTER (HHS-HCC): ICD-10-CM

## 2024-01-11 DIAGNOSIS — Z3A.37 37 WEEKS GESTATION OF PREGNANCY (HHS-HCC): ICD-10-CM

## 2024-01-11 DIAGNOSIS — O09.299 HISTORY OF PRE-ECLAMPSIA IN PRIOR PREGNANCY, CURRENTLY PREGNANT (HHS-HCC): Primary | ICD-10-CM

## 2024-01-11 DIAGNOSIS — Z03.74 ENCOUNTER FOR SUSPECTED PROBLEM WITH FETAL GROWTH RULED OUT: ICD-10-CM

## 2024-01-11 DIAGNOSIS — O99.210 OBESITY IN PREGNANCY (HHS-HCC): ICD-10-CM

## 2024-01-11 DIAGNOSIS — Z87.59 HISTORY OF SHOULDER DYSTOCIA IN PRIOR PREGNANCY: ICD-10-CM

## 2024-01-11 DIAGNOSIS — O99.013 ANEMIA DURING PREGNANCY IN THIRD TRIMESTER (HHS-HCC): ICD-10-CM

## 2024-01-11 PROCEDURE — 99213 OFFICE O/P EST LOW 20 MIN: CPT

## 2024-01-11 PROCEDURE — 99213 OFFICE O/P EST LOW 20 MIN: CPT | Mod: GC,TH

## 2024-01-11 PROCEDURE — 76816 OB US FOLLOW-UP PER FETUS: CPT | Performed by: OBSTETRICS & GYNECOLOGY

## 2024-01-11 PROCEDURE — 76816 OB US FOLLOW-UP PER FETUS: CPT

## 2024-01-11 PROCEDURE — 76819 FETAL BIOPHYS PROFIL W/O NST: CPT | Performed by: OBSTETRICS & GYNECOLOGY

## 2024-01-11 PROCEDURE — 87081 CULTURE SCREEN ONLY: CPT

## 2024-01-11 PROCEDURE — 76819 FETAL BIOPHYS PROFIL W/O NST: CPT

## 2024-01-11 RX ORDER — ACETAMINOPHEN 500 MG
1 TABLET ORAL DAILY
Qty: 1 EACH | Refills: 0 | Status: SHIPPED | OUTPATIENT
Start: 2024-01-11 | End: 2024-04-24 | Stop reason: WASHOUT

## 2024-01-11 ASSESSMENT — PAIN - FUNCTIONAL ASSESSMENT: PAIN_FUNCTIONAL_ASSESSMENT: 0-10

## 2024-01-11 ASSESSMENT — PAIN SCALES - GENERAL: PAINLEVEL_OUTOF10: 0 - NO PAIN

## 2024-01-11 NOTE — PROGRESS NOTES
Ob Follow-up  24     SUBJECTIVE      HPI: Darcie Monroe is a 23 y.o.  at 36w6d here for RPNV.  She has intermittent mild contractions, no bleeding, or no LOF. Reports normal fetal movement.        OBJECTIVE  Visit Vitals  /83   Pulse 99   Wt 150 kg (330 lb)   LMP 2023   BMI 54.91 kg/m²   OB Status Pregnant   Smoking Status Never   BSA 2.62 m²        FHT: US today    ASSESSMENT & PLAN    Darcie Monroe is a 23 y.o.  at 36w6d here for the following concerns we addressed today:    Problem List Items Addressed This Visit          Pregnancy    Obesity in pregnancy    Overview     MD CARE ONLY          Current Assessment & Plan     Growth US today: EFW 67% AC 55%    BPP next week         History of shoulder dystocia in prior pregnancy    Overview     Patient previously reported hx of shoulder dystocia in prior delivery, however upon chart review. There was no shoulder dystocia. Per documentation in delivery summary, patient stopped pushing after delivery of head but with coaching eventually delivered remainder of  without difficulty. Baby was 9#6 and had broken clavicle, however, previous documentation states no shoulder and no manuvers performed         Current Assessment & Plan     Discussed above with patient         History of pre-eclampsia in prior pregnancy, currently pregnant - Primary    Overview     On ASA         Current Assessment & Plan     Normotensive today. Asx. PEC symptoms discussed. Pt requesting new BP cuff         Relevant Medications    blood pressure test kit-large kit    Other Relevant Orders    CBC Anemia Panel With Reflex, Pregnancy    Reticulocytes    High risk multigravida in third trimester    Overview     Dating:   Baby girl [place]  [x] Initial BMI: 50  [x] Prenatal Labs:   [] Aneuploidy Screening:    [x] Baby ASA:  [x] Anatomy US:  [x] 1hr GCT at 24-28wks: elevated   [] 3 hr - ordered, not done  [x] Tdap (27-36wks):   [x] Flu Shot: declined  12/19  [x] COVID vaccine: declined 12/19  [x] Rhogam (if Rh neg):  not inidicated  [] GBS at 36 wks: collected 1/11  [] Breastfeeding  [x] PPBC: Nexplanon, interval BTL  [] 39 weeks discussion of IOL vs. Expectant management: desires IOL - RN tasked to schedule  [] Mode of delivery:          Current Assessment & Plan     GBS collected today. 3hr not done. IOL to be scheduled at 39 weeks         Anemia during pregnancy in third trimester    Overview     Hgb 10.1  Iron Rxed  Repeat CBC, retic count, iron studies.         Current Assessment & Plan     Doing well with Iron every other day. Repeat cbc, retic count, ordered - pt states she will get tomorrow         37 weeks gestation of pregnancy    Relevant Orders    Group B Streptococcus (GBS) Prenatal Screen, Culture         Orders Placed This Encounter   Procedures    Group B Streptococcus (GBS) Prenatal Screen, Culture     If PCN-allergic, please send sensitivities     Order Specific Question:   Release result to [employer]     Answer:   Immediate [1]    CBC Anemia Panel With Reflex, Pregnancy     Standing Status:   Future     Standing Expiration Date:   1/11/2025     Order Specific Question:   Release result to MyChart     Answer:   Immediate [1]    Reticulocytes     Standing Status:   Future     Standing Expiration Date:   1/11/2025     Order Specific Question:   Release result to MyChart     Answer:   Immediate [1]        RTC in 1 week    Seen and discussed with Dr. Jacques Gonzalez MD

## 2024-01-11 NOTE — PROGRESS NOTES
I saw and evaluated the patient. I personally obtained the key and critical portions of the history and physical exam or was physically present for key and critical portions performed by the resident/fellow. I reviewed the resident/fellow's documentation and discussed the patient with the resident/fellow. I agree with the resident/fellow's medical decision making as documented in the note.    Mechelle Hanna MD

## 2024-01-11 NOTE — ASSESSMENT & PLAN NOTE
Doing well with Iron every other day. Repeat cbc, retic count, ordered - pt states she will get tomorrow

## 2024-01-12 ENCOUNTER — TELEPHONE (OUTPATIENT)
Dept: MATERNAL FETAL MEDICINE | Facility: CLINIC | Age: 24
End: 2024-01-12
Payer: COMMERCIAL

## 2024-01-12 DIAGNOSIS — O09.43 HIGH RISK MULTIGRAVIDA IN THIRD TRIMESTER (HHS-HCC): Primary | ICD-10-CM

## 2024-01-12 NOTE — TELEPHONE ENCOUNTER
----- Message from Nidia Gonzalez MD sent at 1/11/2024  4:18 PM EST -----  Hi can we schedule this patient for 39 wk IOL?    Thank you!    
----- Message from Nidia Gonzalez MD sent at 1/11/2024  4:18 PM EST -----  Hi can we schedule this patient for 39 wk IOL?    Thank you!    
IOL scheduled for  39.2 wks 1/28/24  1100  PT has been informed of date time, visitors, length of stay and may eat. Advised to present for future appts  
Patient/Caregiver provided printed discharge information.

## 2024-01-14 LAB — GP B STREP GENITAL QL CULT: ABNORMAL

## 2024-01-19 ENCOUNTER — APPOINTMENT (OUTPATIENT)
Dept: RADIOLOGY | Facility: CLINIC | Age: 24
End: 2024-01-19
Payer: COMMERCIAL

## 2024-01-19 ENCOUNTER — APPOINTMENT (OUTPATIENT)
Dept: OBSTETRICS AND GYNECOLOGY | Facility: CLINIC | Age: 24
End: 2024-01-19
Payer: COMMERCIAL

## 2024-01-22 ENCOUNTER — ROUTINE PRENATAL (OUTPATIENT)
Dept: OBSTETRICS AND GYNECOLOGY | Facility: CLINIC | Age: 24
End: 2024-01-22
Payer: COMMERCIAL

## 2024-01-22 ENCOUNTER — HOSPITAL ENCOUNTER (OUTPATIENT)
Dept: RADIOLOGY | Facility: CLINIC | Age: 24
End: 2024-01-22
Payer: COMMERCIAL

## 2024-01-22 ENCOUNTER — APPOINTMENT (OUTPATIENT)
Dept: OBSTETRICS AND GYNECOLOGY | Facility: CLINIC | Age: 24
End: 2024-01-22
Payer: COMMERCIAL

## 2024-01-22 ENCOUNTER — HOSPITAL ENCOUNTER (OUTPATIENT)
Dept: RADIOLOGY | Facility: CLINIC | Age: 24
Discharge: HOME | End: 2024-01-22
Payer: COMMERCIAL

## 2024-01-22 VITALS — SYSTOLIC BLOOD PRESSURE: 100 MMHG | WEIGHT: 293 LBS | BODY MASS INDEX: 55.38 KG/M2 | DIASTOLIC BLOOD PRESSURE: 68 MMHG

## 2024-01-22 DIAGNOSIS — Z3A.38 38 WEEKS GESTATION OF PREGNANCY (HHS-HCC): ICD-10-CM

## 2024-01-22 DIAGNOSIS — O99.013 ANEMIA DURING PREGNANCY IN THIRD TRIMESTER (HHS-HCC): ICD-10-CM

## 2024-01-22 DIAGNOSIS — O99.210 OBESITY IN PREGNANCY (HHS-HCC): ICD-10-CM

## 2024-01-22 DIAGNOSIS — Z87.59 HISTORY OF SHOULDER DYSTOCIA IN PRIOR PREGNANCY: ICD-10-CM

## 2024-01-22 DIAGNOSIS — O09.299 HISTORY OF PRE-ECLAMPSIA IN PRIOR PREGNANCY, CURRENTLY PREGNANT (HHS-HCC): ICD-10-CM

## 2024-01-22 DIAGNOSIS — R73.09 ELEVATED GLUCOSE LEVEL: ICD-10-CM

## 2024-01-22 DIAGNOSIS — O09.43 HIGH RISK MULTIGRAVIDA IN THIRD TRIMESTER (HHS-HCC): ICD-10-CM

## 2024-01-22 DIAGNOSIS — Z03.74 ENCOUNTER FOR SUSPECTED PROBLEM WITH FETAL GROWTH RULED OUT: ICD-10-CM

## 2024-01-22 PROBLEM — Z86.73 PERSONAL HISTORY OF TRANSIENT ISCHEMIC ATTACK (TIA), AND CEREBRAL INFARCTION WITHOUT RESIDUAL DEFICITS: Status: ACTIVE | Noted: 2024-01-22

## 2024-01-22 PROBLEM — Z3A.37 37 WEEKS GESTATION OF PREGNANCY (HHS-HCC): Status: RESOLVED | Noted: 2023-12-19 | Resolved: 2024-01-22

## 2024-01-22 PROBLEM — K21.9 GERD (GASTROESOPHAGEAL REFLUX DISEASE): Status: RESOLVED | Noted: 2023-10-26 | Resolved: 2024-01-22

## 2024-01-22 PROBLEM — I51.9 HEART DISEASE, UNSPECIFIED: Status: ACTIVE | Noted: 2024-01-22

## 2024-01-22 PROBLEM — Z86.69 PERSONAL HISTORY OF OTHER DISEASES OF THE NERVOUS SYSTEM AND SENSE ORGANS: Status: ACTIVE | Noted: 2024-01-22

## 2024-01-22 PROCEDURE — 76819 FETAL BIOPHYS PROFIL W/O NST: CPT

## 2024-01-22 PROCEDURE — 99213 OFFICE O/P EST LOW 20 MIN: CPT | Performed by: ADVANCED PRACTICE MIDWIFE

## 2024-01-22 PROCEDURE — 99213 OFFICE O/P EST LOW 20 MIN: CPT | Mod: TH | Performed by: ADVANCED PRACTICE MIDWIFE

## 2024-01-22 PROCEDURE — 76819 FETAL BIOPHYS PROFIL W/O NST: CPT | Performed by: STUDENT IN AN ORGANIZED HEALTH CARE EDUCATION/TRAINING PROGRAM

## 2024-01-22 ASSESSMENT — ENCOUNTER SYMPTOMS
CONSTITUTIONAL NEGATIVE: 0
PSYCHIATRIC NEGATIVE: 0
ENDOCRINE NEGATIVE: 0
MUSCULOSKELETAL NEGATIVE: 0
ALLERGIC/IMMUNOLOGIC NEGATIVE: 0
GASTROINTESTINAL NEGATIVE: 0
RESPIRATORY NEGATIVE: 0
CARDIOVASCULAR NEGATIVE: 0
EYES NEGATIVE: 0
NEUROLOGICAL NEGATIVE: 0
HEMATOLOGIC/LYMPHATIC NEGATIVE: 0

## 2024-01-22 NOTE — PROGRESS NOTES
Subjective     Darcie Monroe is a 24 y.o.  at 38w3d with a working estimated date of delivery of 2024, by Last Menstrual Period who presents for a routine prenatal visit.     She denies vaginal bleeding, leakage of fluid, decreased fetal movements, or contractions.    Patient not been taking Bps at home as cuff was miscalibrated. She didn't bring it today.   Patient not taking PO iron.     BPP after our visit.     Has IOL on . Planning on doing 3hr today.     Objective   Physical Exam:   Weight: (!) 151 kg (332 lb 12.8 oz)  Expected Total Weight Gain: 5 kg (11 lb)-9 kg (19 lb)   Pregravid BMI: 51.59  BP: 100/68 (pluse-123)  Fetal Heart Rate: 140                 Problem List Items Addressed This Visit       Elevated glucose level    Overview     1hr 145-   3hr ordered ; not done at 38w         High risk multigravida in third trimester    Overview     Dating: LMP c/s 7w US  Baby girl [place]  [x] Initial BMI: 50  [x] Prenatal Labs:   [x] Aneuploidy Screening:  CF/SMA neg   [x] Baby ASA:  [x] Anatomy US:  [x] 1hr GCT at 24-28wks: elevated   [] 3 hr - ordered, not done  [x] Tdap (27-36wks):   [x] Flu Shot: declined   [x] COVID vaccine: declined   [x] Rhogam (if Rh neg):  not inidicated  [x] GBS at 36 wks: positive   [x] Breastfeeding and bottle  [x] PPBC: Nexplanon, interval BTL  [x] 39 weeks discussion of IOL vs. Expectant management: desires IOL - RN tasked to schedule         History of pre-eclampsia in prior pregnancy, currently pregnant    Overview     HELLP labs WNL   On ASA         Anemia during pregnancy in third trimester    Overview     Lab Results   Component Value Date    WBC 12.8 (H) 2023    HGB 9.9 (L) 2023    HCT 32.6 (L) 2023    MCV 79 (L) 2023     2023            Obesity in pregnancy    Overview     MD CARE ONLY          RESOLVED: 37 weeks gestation of pregnancy    History of shoulder dystocia in prior pregnancy    Overview      Patient previously reported hx of shoulder dystocia in prior delivery, however upon chart review. There was no shoulder dystocia. Per documentation in delivery summary, patient stopped pushing after delivery of head but with coaching eventually delivered remainder of  without difficulty. Baby was 9#6 and had broken clavicle, however, previous documentation states no shoulder and no manuvers performed         BMI 50.0-59.9, adult (CMS/[facility]) - Primary    Overview     BMI = 51.59 at NOB  Fetal surveillance BMI >40 at NOB:  Growth US at 30 (50% at 33w) and 36 wks (67%)  BPP or NST weekly 34 wks to delivery    Timing of delivery: 39 0/7 - 39 6/7 wks  *BMI >= 50 at any time in pregnancy: Deliver at Level 4              IOL scheduled for   Growth US after visit  Reviewed no need for pre-39 week IOL due to not having had 3hr GTT with Dr Torres   Reviewed s/sx of labor, warning signs, fetal movement counts, and when to call provider    EZEQUIEL Flores   Alert and oriented to person, place and time

## 2024-01-26 ENCOUNTER — ANESTHESIA (OUTPATIENT)
Dept: OBSTETRICS AND GYNECOLOGY | Facility: HOSPITAL | Age: 24
End: 2024-01-26
Payer: COMMERCIAL

## 2024-01-26 ENCOUNTER — APPOINTMENT (OUTPATIENT)
Dept: OBSTETRICS AND GYNECOLOGY | Facility: HOSPITAL | Age: 24
End: 2024-01-26
Payer: COMMERCIAL

## 2024-01-26 ENCOUNTER — HOSPITAL ENCOUNTER (INPATIENT)
Facility: HOSPITAL | Age: 24
LOS: 4 days | Discharge: HOME | End: 2024-01-30
Attending: STUDENT IN AN ORGANIZED HEALTH CARE EDUCATION/TRAINING PROGRAM | Admitting: OBSTETRICS & GYNECOLOGY
Payer: COMMERCIAL

## 2024-01-26 ENCOUNTER — ANESTHESIA EVENT (OUTPATIENT)
Dept: OBSTETRICS AND GYNECOLOGY | Facility: HOSPITAL | Age: 24
End: 2024-01-26
Payer: COMMERCIAL

## 2024-01-26 DIAGNOSIS — O09.43 HIGH RISK MULTIGRAVIDA IN THIRD TRIMESTER (HHS-HCC): ICD-10-CM

## 2024-01-26 PROBLEM — Z34.90 ENCOUNTER FOR ELECTIVE INDUCTION OF LABOR (HHS-HCC): Status: ACTIVE | Noted: 2024-01-26

## 2024-01-26 PROBLEM — I63.9 CVA (CEREBRAL VASCULAR ACCIDENT) (MULTI): Status: ACTIVE | Noted: 2024-01-26

## 2024-01-26 LAB
ABO GROUP (TYPE) IN BLOOD: NORMAL
ANTIBODY SCREEN: NORMAL
ERYTHROCYTE [DISTWIDTH] IN BLOOD BY AUTOMATED COUNT: 15.4 % (ref 11.5–14.5)
GLUCOSE BLD MANUAL STRIP-MCNC: 99 MG/DL (ref 74–99)
HCT VFR BLD AUTO: 31.9 % (ref 36–46)
HGB BLD-MCNC: 9.9 G/DL (ref 12–16)
MCH RBC QN AUTO: 23.9 PG (ref 26–34)
MCHC RBC AUTO-ENTMCNC: 31 G/DL (ref 32–36)
MCV RBC AUTO: 77 FL (ref 80–100)
NRBC BLD-RTO: 0 /100 WBCS (ref 0–0)
PLATELET # BLD AUTO: 324 X10*3/UL (ref 150–450)
RBC # BLD AUTO: 4.14 X10*6/UL (ref 4–5.2)
RH FACTOR (ANTIGEN D): NORMAL
TREPONEMA PALLIDUM IGG+IGM AB [PRESENCE] IN SERUM OR PLASMA BY IMMUNOASSAY: NONREACTIVE
WBC # BLD AUTO: 11.9 X10*3/UL (ref 4.4–11.3)

## 2024-01-26 PROCEDURE — 86920 COMPATIBILITY TEST SPIN: CPT

## 2024-01-26 PROCEDURE — 86901 BLOOD TYPING SEROLOGIC RH(D): CPT

## 2024-01-26 PROCEDURE — 36415 COLL VENOUS BLD VENIPUNCTURE: CPT

## 2024-01-26 PROCEDURE — 82947 ASSAY GLUCOSE BLOOD QUANT: CPT

## 2024-01-26 PROCEDURE — 85027 COMPLETE CBC AUTOMATED: CPT

## 2024-01-26 PROCEDURE — 86780 TREPONEMA PALLIDUM: CPT

## 2024-01-26 PROCEDURE — 2500000004 HC RX 250 GENERAL PHARMACY W/ HCPCS (ALT 636 FOR OP/ED)

## 2024-01-26 PROCEDURE — 1120000001 HC OB PRIVATE ROOM DAILY

## 2024-01-26 RX ORDER — CARBOPROST TROMETHAMINE 250 UG/ML
250 INJECTION, SOLUTION INTRAMUSCULAR ONCE AS NEEDED
Status: DISCONTINUED | OUTPATIENT
Start: 2024-01-26 | End: 2024-01-28

## 2024-01-26 RX ORDER — METOCLOPRAMIDE 10 MG/1
10 TABLET ORAL EVERY 6 HOURS PRN
Status: DISCONTINUED | OUTPATIENT
Start: 2024-01-26 | End: 2024-01-28

## 2024-01-26 RX ORDER — METOCLOPRAMIDE HYDROCHLORIDE 5 MG/ML
10 INJECTION INTRAMUSCULAR; INTRAVENOUS EVERY 6 HOURS PRN
Status: DISCONTINUED | OUTPATIENT
Start: 2024-01-26 | End: 2024-01-28

## 2024-01-26 RX ORDER — MISOPROSTOL 200 UG/1
800 TABLET ORAL ONCE AS NEEDED
Status: DISCONTINUED | OUTPATIENT
Start: 2024-01-26 | End: 2024-01-28

## 2024-01-26 RX ORDER — METHYLERGONOVINE MALEATE 0.2 MG/ML
0.2 INJECTION INTRAVENOUS ONCE AS NEEDED
Status: DISCONTINUED | OUTPATIENT
Start: 2024-01-26 | End: 2024-01-28

## 2024-01-26 RX ORDER — SODIUM CHLORIDE, SODIUM LACTATE, POTASSIUM CHLORIDE, CALCIUM CHLORIDE 600; 310; 30; 20 MG/100ML; MG/100ML; MG/100ML; MG/100ML
125 INJECTION, SOLUTION INTRAVENOUS CONTINUOUS
Status: DISCONTINUED | OUTPATIENT
Start: 2024-01-26 | End: 2024-01-28

## 2024-01-26 RX ORDER — TRANEXAMIC ACID 100 MG/ML
1000 INJECTION, SOLUTION INTRAVENOUS ONCE AS NEEDED
Status: DISCONTINUED | OUTPATIENT
Start: 2024-01-26 | End: 2024-01-28

## 2024-01-26 RX ORDER — LIDOCAINE HYDROCHLORIDE 10 MG/ML
30 INJECTION INFILTRATION; PERINEURAL ONCE AS NEEDED
Status: DISCONTINUED | OUTPATIENT
Start: 2024-01-26 | End: 2024-01-28

## 2024-01-26 RX ORDER — NIFEDIPINE 10 MG/1
10 CAPSULE ORAL ONCE AS NEEDED
Status: DISCONTINUED | OUTPATIENT
Start: 2024-01-26 | End: 2024-01-28

## 2024-01-26 RX ORDER — ONDANSETRON 4 MG/1
4 TABLET, FILM COATED ORAL EVERY 6 HOURS PRN
Status: DISCONTINUED | OUTPATIENT
Start: 2024-01-26 | End: 2024-01-28

## 2024-01-26 RX ORDER — TERBUTALINE SULFATE 1 MG/ML
0.25 INJECTION SUBCUTANEOUS ONCE AS NEEDED
Status: DISCONTINUED | OUTPATIENT
Start: 2024-01-26 | End: 2024-01-28

## 2024-01-26 RX ORDER — PENICILLIN G 3000000 [IU]/50ML
3 INJECTION, SOLUTION INTRAVENOUS EVERY 4 HOURS
Status: DISCONTINUED | OUTPATIENT
Start: 2024-01-26 | End: 2024-01-28

## 2024-01-26 RX ORDER — OXYTOCIN 10 [USP'U]/ML
10 INJECTION, SOLUTION INTRAMUSCULAR; INTRAVENOUS ONCE AS NEEDED
Status: DISCONTINUED | OUTPATIENT
Start: 2024-01-26 | End: 2024-01-28

## 2024-01-26 RX ORDER — BUTORPHANOL TARTRATE 1 MG/ML
1 INJECTION INTRAMUSCULAR; INTRAVENOUS EVERY 10 MIN PRN
Status: DISCONTINUED | OUTPATIENT
Start: 2024-01-26 | End: 2024-01-28

## 2024-01-26 RX ORDER — NALBUPHINE HYDROCHLORIDE 10 MG/ML
10 INJECTION, SOLUTION INTRAMUSCULAR; INTRAVENOUS; SUBCUTANEOUS
Status: DISCONTINUED | OUTPATIENT
Start: 2024-01-26 | End: 2024-01-28

## 2024-01-26 RX ORDER — ONDANSETRON HYDROCHLORIDE 2 MG/ML
4 INJECTION, SOLUTION INTRAVENOUS EVERY 6 HOURS PRN
Status: DISCONTINUED | OUTPATIENT
Start: 2024-01-26 | End: 2024-01-28

## 2024-01-26 RX ORDER — LABETALOL HYDROCHLORIDE 5 MG/ML
20 INJECTION, SOLUTION INTRAVENOUS ONCE AS NEEDED
Status: DISCONTINUED | OUTPATIENT
Start: 2024-01-26 | End: 2024-01-28

## 2024-01-26 RX ORDER — HYDRALAZINE HYDROCHLORIDE 20 MG/ML
5 INJECTION INTRAMUSCULAR; INTRAVENOUS ONCE AS NEEDED
Status: DISCONTINUED | OUTPATIENT
Start: 2024-01-26 | End: 2024-01-28

## 2024-01-26 RX ORDER — OXYTOCIN/0.9 % SODIUM CHLORIDE 30/500 ML
60 PLASTIC BAG, INJECTION (ML) INTRAVENOUS ONCE AS NEEDED
Status: DISCONTINUED | OUTPATIENT
Start: 2024-01-26 | End: 2024-01-28

## 2024-01-26 RX ORDER — LOPERAMIDE HYDROCHLORIDE 2 MG/1
4 CAPSULE ORAL EVERY 2 HOUR PRN
Status: DISCONTINUED | OUTPATIENT
Start: 2024-01-26 | End: 2024-01-28

## 2024-01-26 RX ADMIN — PENICILLIN G POTASSIUM 5 MILLION UNITS: 5000000 INJECTION, POWDER, FOR SOLUTION INTRAMUSCULAR; INTRAVENOUS at 19:34

## 2024-01-26 RX ADMIN — SODIUM CHLORIDE, POTASSIUM CHLORIDE, SODIUM LACTATE AND CALCIUM CHLORIDE 125 ML/HR: 600; 310; 30; 20 INJECTION, SOLUTION INTRAVENOUS at 18:15

## 2024-01-26 SDOH — SOCIAL STABILITY: SOCIAL INSECURITY: PHYSICAL ABUSE: DENIES

## 2024-01-26 SDOH — SOCIAL STABILITY: SOCIAL INSECURITY: VERBAL ABUSE: DENIES

## 2024-01-26 SDOH — SOCIAL STABILITY: SOCIAL INSECURITY: ARE THERE ANY APPARENT SIGNS OF INJURIES/BEHAVIORS THAT COULD BE RELATED TO ABUSE/NEGLECT?: NO

## 2024-01-26 SDOH — SOCIAL STABILITY: SOCIAL INSECURITY: ARE YOU OR HAVE YOU BEEN THREATENED OR ABUSED PHYSICALLY, EMOTIONALLY, OR SEXUALLY BY ANYONE?: NO

## 2024-01-26 SDOH — SOCIAL STABILITY: SOCIAL INSECURITY: ABUSE SCREEN: ADULT

## 2024-01-26 SDOH — HEALTH STABILITY: MENTAL HEALTH: HAVE YOU USED ANY PRESCRIPTION DRUGS OTHER THAN PRESCRIBED IN THE PAST 12 MONTHS?: NO

## 2024-01-26 SDOH — HEALTH STABILITY: MENTAL HEALTH: NON-SPECIFIC ACTIVE SUICIDAL THOUGHTS (PAST 1 MONTH): NO

## 2024-01-26 SDOH — HEALTH STABILITY: MENTAL HEALTH: SUICIDAL BEHAVIOR (LIFETIME): NO

## 2024-01-26 SDOH — HEALTH STABILITY: MENTAL HEALTH: WISH TO BE DEAD (PAST 1 MONTH): NO

## 2024-01-26 SDOH — ECONOMIC STABILITY: HOUSING INSECURITY: DO YOU FEEL UNSAFE GOING BACK TO THE PLACE WHERE YOU ARE LIVING?: NO

## 2024-01-26 SDOH — SOCIAL STABILITY: SOCIAL INSECURITY: HAVE YOU HAD THOUGHTS OF HARMING ANYONE ELSE?: NO

## 2024-01-26 SDOH — SOCIAL STABILITY: SOCIAL INSECURITY: DO YOU FEEL ANYONE HAS EXPLOITED OR TAKEN ADVANTAGE OF YOU FINANCIALLY OR OF YOUR PERSONAL PROPERTY?: NO

## 2024-01-26 SDOH — HEALTH STABILITY: MENTAL HEALTH: STRENGTHS (MUST CHOOSE TWO): SUPPORT FROM FAMILY;SENSE OF HUMOR

## 2024-01-26 SDOH — HEALTH STABILITY: MENTAL HEALTH: WERE YOU ABLE TO COMPLETE ALL THE BEHAVIORAL HEALTH SCREENINGS?: YES

## 2024-01-26 SDOH — SOCIAL STABILITY: SOCIAL INSECURITY: DOES ANYONE TRY TO KEEP YOU FROM HAVING/CONTACTING OTHER FRIENDS OR DOING THINGS OUTSIDE YOUR HOME?: NO

## 2024-01-26 SDOH — HEALTH STABILITY: MENTAL HEALTH: HAVE YOU USED ANY SUBSTANCES (CANABIS, COCAINE, HEROIN, HALLUCINOGENS, INHALANTS, ETC.) IN THE PAST 12 MONTHS?: NO

## 2024-01-26 SDOH — SOCIAL STABILITY: SOCIAL INSECURITY: HAS ANYONE EVER THREATENED TO HURT YOUR FAMILY OR YOUR PETS?: NO

## 2024-01-26 ASSESSMENT — LIFESTYLE VARIABLES
AUDIT-C TOTAL SCORE: 0
HOW MANY STANDARD DRINKS CONTAINING ALCOHOL DO YOU HAVE ON A TYPICAL DAY: PATIENT DOES NOT DRINK
HOW OFTEN DO YOU HAVE 6 OR MORE DRINKS ON ONE OCCASION: NEVER
SKIP TO QUESTIONS 9-10: 1
HOW OFTEN DO YOU HAVE A DRINK CONTAINING ALCOHOL: NEVER
AUDIT-C TOTAL SCORE: 0

## 2024-01-26 ASSESSMENT — PATIENT HEALTH QUESTIONNAIRE - PHQ9
2. FEELING DOWN, DEPRESSED OR HOPELESS: NOT AT ALL
1. LITTLE INTEREST OR PLEASURE IN DOING THINGS: NOT AT ALL
SUM OF ALL RESPONSES TO PHQ9 QUESTIONS 1 & 2: 0

## 2024-01-26 ASSESSMENT — PAIN SCALES - GENERAL
PAINLEVEL_OUTOF10: 0 - NO PAIN

## 2024-01-26 ASSESSMENT — ACTIVITIES OF DAILY LIVING (ADL): LACK_OF_TRANSPORTATION: NO

## 2024-01-27 ENCOUNTER — ANESTHESIA (OUTPATIENT)
Dept: POSTPARTUM | Facility: HOSPITAL | Age: 24
End: 2024-01-27
Payer: COMMERCIAL

## 2024-01-27 ENCOUNTER — ANESTHESIA EVENT (OUTPATIENT)
Dept: POSTPARTUM | Facility: HOSPITAL | Age: 24
End: 2024-01-27
Payer: COMMERCIAL

## 2024-01-27 LAB
GLUCOSE BLD MANUAL STRIP-MCNC: 71 MG/DL (ref 74–99)
GLUCOSE BLD MANUAL STRIP-MCNC: 81 MG/DL (ref 74–99)
GLUCOSE BLD MANUAL STRIP-MCNC: 83 MG/DL (ref 74–99)
GLUCOSE BLD MANUAL STRIP-MCNC: 95 MG/DL (ref 74–99)
GLUCOSE BLD MANUAL STRIP-MCNC: 99 MG/DL (ref 74–99)

## 2024-01-27 PROCEDURE — 2500000001 HC RX 250 WO HCPCS SELF ADMINISTERED DRUGS (ALT 637 FOR MEDICARE OP): Performed by: STUDENT IN AN ORGANIZED HEALTH CARE EDUCATION/TRAINING PROGRAM

## 2024-01-27 PROCEDURE — 2500000004 HC RX 250 GENERAL PHARMACY W/ HCPCS (ALT 636 FOR OP/ED): Performed by: STUDENT IN AN ORGANIZED HEALTH CARE EDUCATION/TRAINING PROGRAM

## 2024-01-27 PROCEDURE — 01967 NEURAXL LBR ANES VAG DLVR: CPT | Performed by: ANESTHESIOLOGY

## 2024-01-27 PROCEDURE — 10907ZC DRAINAGE OF AMNIOTIC FLUID, THERAPEUTIC FROM PRODUCTS OF CONCEPTION, VIA NATURAL OR ARTIFICIAL OPENING: ICD-10-PCS | Performed by: STUDENT IN AN ORGANIZED HEALTH CARE EDUCATION/TRAINING PROGRAM

## 2024-01-27 PROCEDURE — 82947 ASSAY GLUCOSE BLOOD QUANT: CPT

## 2024-01-27 PROCEDURE — 51702 INSERT TEMP BLADDER CATH: CPT

## 2024-01-27 PROCEDURE — 59050 FETAL MONITOR W/REPORT: CPT

## 2024-01-27 PROCEDURE — 2500000004 HC RX 250 GENERAL PHARMACY W/ HCPCS (ALT 636 FOR OP/ED)

## 2024-01-27 PROCEDURE — 3E033VJ INTRODUCTION OF OTHER HORMONE INTO PERIPHERAL VEIN, PERCUTANEOUS APPROACH: ICD-10-PCS | Performed by: STUDENT IN AN ORGANIZED HEALTH CARE EDUCATION/TRAINING PROGRAM

## 2024-01-27 PROCEDURE — 59409 OBSTETRICAL CARE: CPT | Performed by: STUDENT IN AN ORGANIZED HEALTH CARE EDUCATION/TRAINING PROGRAM

## 2024-01-27 PROCEDURE — 1100000001 HC PRIVATE ROOM DAILY

## 2024-01-27 PROCEDURE — 2500000005 HC RX 250 GENERAL PHARMACY W/O HCPCS: Performed by: STUDENT IN AN ORGANIZED HEALTH CARE EDUCATION/TRAINING PROGRAM

## 2024-01-27 PROCEDURE — 59409 OBSTETRICAL CARE: CPT

## 2024-01-27 RX ORDER — MORPHINE SULFATE 4 MG/ML
4 INJECTION INTRAVENOUS ONCE
Status: COMPLETED | OUTPATIENT
Start: 2024-01-27 | End: 2024-01-27

## 2024-01-27 RX ORDER — FENTANYL/BUPIVACAINE/NS/PF 2MCG/ML-.1
0-54 PLASTIC BAG, INJECTION (ML) INJECTION CONTINUOUS
Status: DISCONTINUED | OUTPATIENT
Start: 2024-01-27 | End: 2024-01-28

## 2024-01-27 RX ORDER — FENTANYL/BUPIVACAINE/NS/PF 2MCG/ML-.1
PLASTIC BAG, INJECTION (ML) INJECTION AS NEEDED
Status: DISCONTINUED | OUTPATIENT
Start: 2024-01-27 | End: 2024-01-27

## 2024-01-27 RX ORDER — FENTANYL CITRATE 50 UG/ML
INJECTION, SOLUTION INTRAMUSCULAR; INTRAVENOUS AS NEEDED
Status: DISCONTINUED | OUTPATIENT
Start: 2024-01-27 | End: 2024-01-27

## 2024-01-27 RX ORDER — LIDOCAINE HYDROCHLORIDE AND EPINEPHRINE 15; 5 MG/ML; UG/ML
INJECTION, SOLUTION EPIDURAL AS NEEDED
Status: DISCONTINUED | OUTPATIENT
Start: 2024-01-27 | End: 2024-01-27

## 2024-01-27 RX ORDER — LIDOCAINE HYDROCHLORIDE AND EPINEPHRINE 10; 10 MG/ML; UG/ML
INJECTION, SOLUTION INFILTRATION; PERINEURAL AS NEEDED
Status: DISCONTINUED | OUTPATIENT
Start: 2024-01-27 | End: 2024-01-27

## 2024-01-27 RX ORDER — MORPHINE SULFATE 2 MG/ML
2 INJECTION, SOLUTION INTRAMUSCULAR; INTRAVENOUS ONCE
Status: COMPLETED | OUTPATIENT
Start: 2024-01-27 | End: 2024-01-27

## 2024-01-27 RX ORDER — DIPHENHYDRAMINE HYDROCHLORIDE 50 MG/ML
25 INJECTION INTRAMUSCULAR; INTRAVENOUS ONCE
Status: COMPLETED | OUTPATIENT
Start: 2024-01-27 | End: 2024-01-27

## 2024-01-27 RX ORDER — OXYTOCIN/0.9 % SODIUM CHLORIDE 30/500 ML
2-30 PLASTIC BAG, INJECTION (ML) INTRAVENOUS CONTINUOUS
Status: DISCONTINUED | OUTPATIENT
Start: 2024-01-27 | End: 2024-01-28

## 2024-01-27 RX ORDER — ACETAMINOPHEN 325 MG/1
975 TABLET ORAL EVERY 6 HOURS
Status: DISCONTINUED | OUTPATIENT
Start: 2024-01-27 | End: 2024-01-30 | Stop reason: HOSPADM

## 2024-01-27 RX ORDER — LIDOCAINE HCL/EPINEPHRINE/PF 2%-1:200K
VIAL (ML) INJECTION AS NEEDED
Status: DISCONTINUED | OUTPATIENT
Start: 2024-01-27 | End: 2024-01-27

## 2024-01-27 RX ORDER — PHENYLEPHRINE HCL IN 0.9% NACL 0.4MG/10ML
SYRINGE (ML) INTRAVENOUS AS NEEDED
Status: DISCONTINUED | OUTPATIENT
Start: 2024-01-27 | End: 2024-01-27

## 2024-01-27 RX ORDER — IBUPROFEN 600 MG/1
600 TABLET ORAL EVERY 6 HOURS
Status: DISCONTINUED | OUTPATIENT
Start: 2024-01-27 | End: 2024-01-30 | Stop reason: HOSPADM

## 2024-01-27 RX ADMIN — PENICILLIN G 3 MILLION UNITS: 3000000 INJECTION, SOLUTION INTRAVENOUS at 05:30

## 2024-01-27 RX ADMIN — FENTANYL CITRATE 100 MCG: 50 INJECTION, SOLUTION INTRAMUSCULAR; INTRAVENOUS at 06:45

## 2024-01-27 RX ADMIN — PENICILLIN G 3 MILLION UNITS: 3000000 INJECTION, SOLUTION INTRAVENOUS at 09:24

## 2024-01-27 RX ADMIN — MORPHINE SULFATE 2 MG: 2 INJECTION, SOLUTION INTRAMUSCULAR; INTRAVENOUS at 20:57

## 2024-01-27 RX ADMIN — Medication 5 ML: at 06:21

## 2024-01-27 RX ADMIN — MORPHINE SULFATE 4 MG: 4 INJECTION INTRAVENOUS at 21:16

## 2024-01-27 RX ADMIN — Medication 14 ML/HR: at 05:56

## 2024-01-27 RX ADMIN — PENICILLIN G 3 MILLION UNITS: 3000000 INJECTION, SOLUTION INTRAVENOUS at 01:15

## 2024-01-27 RX ADMIN — IBUPROFEN 600 MG: 600 TABLET ORAL at 22:36

## 2024-01-27 RX ADMIN — Medication 5 ML: at 01:13

## 2024-01-27 RX ADMIN — Medication 80 MCG: at 01:59

## 2024-01-27 RX ADMIN — DIPHENHYDRAMINE HYDROCHLORIDE 25 MG: 50 INJECTION INTRAMUSCULAR; INTRAVENOUS at 07:48

## 2024-01-27 RX ADMIN — MISOPROSTOL 25 MCG: 100 TABLET ORAL at 02:08

## 2024-01-27 RX ADMIN — PENICILLIN G 3 MILLION UNITS: 3000000 INJECTION, SOLUTION INTRAVENOUS at 13:43

## 2024-01-27 RX ADMIN — Medication 2 MILLI-UNITS/MIN: at 07:30

## 2024-01-27 RX ADMIN — SODIUM CHLORIDE, POTASSIUM CHLORIDE, SODIUM LACTATE AND CALCIUM CHLORIDE 125 ML/HR: 600; 310; 30; 20 INJECTION, SOLUTION INTRAVENOUS at 11:36

## 2024-01-27 RX ADMIN — Medication 80 MCG: at 01:40

## 2024-01-27 RX ADMIN — Medication 80 MCG: at 01:41

## 2024-01-27 RX ADMIN — Medication 120 MCG: at 01:34

## 2024-01-27 RX ADMIN — Medication 5 ML: at 01:10

## 2024-01-27 RX ADMIN — Medication 120 MCG: at 01:49

## 2024-01-27 RX ADMIN — Medication 5 ML: at 06:18

## 2024-01-27 RX ADMIN — LIDOCAINE HYDROCHLORIDE,EPINEPHRINE BITARTRATE 3 ML: 20; .005 INJECTION, SOLUTION EPIDURAL; INFILTRATION; INTRACAUDAL; PERINEURAL at 01:25

## 2024-01-27 RX ADMIN — LIDOCAINE HYDROCHLORIDE,EPINEPHRINE BITARTRATE 3 ML: 20; .005 INJECTION, SOLUTION EPIDURAL; INFILTRATION; INTRACAUDAL; PERINEURAL at 01:20

## 2024-01-27 RX ADMIN — ONDANSETRON 4 MG: 2 INJECTION INTRAMUSCULAR; INTRAVENOUS at 18:32

## 2024-01-27 RX ADMIN — LIDOCAINE HYDROCHLORIDE,EPINEPHRINE BITARTRATE 5 ML: 10; .01 INJECTION, SOLUTION INFILTRATION; PERINEURAL at 16:19

## 2024-01-27 RX ADMIN — LIDOCAINE HYDROCHLORIDE,EPINEPHRINE BITARTRATE 3 ML: 10; .01 INJECTION, SOLUTION INFILTRATION; PERINEURAL at 20:10

## 2024-01-27 RX ADMIN — Medication 120 MCG: at 01:29

## 2024-01-27 RX ADMIN — ACETAMINOPHEN 975 MG: 325 TABLET ORAL at 22:36

## 2024-01-27 RX ADMIN — LIDOCAINE HYDROCHLORIDE AND EPINEPHRINE 3 ML: 15; 5 INJECTION, SOLUTION EPIDURAL at 01:07

## 2024-01-27 RX ADMIN — PENICILLIN G 3 MILLION UNITS: 3000000 INJECTION, SOLUTION INTRAVENOUS at 17:44

## 2024-01-27 ASSESSMENT — PAIN SCALES - GENERAL
PAINLEVEL_OUTOF10: 0 - NO PAIN
PAINLEVEL_OUTOF10: 5 - MODERATE PAIN
PAINLEVEL_OUTOF10: 5 - MODERATE PAIN
PAINLEVEL_OUTOF10: 0 - NO PAIN
PAINLEVEL_OUTOF10: 10 - WORST POSSIBLE PAIN
PAINLEVEL_OUTOF10: 0 - NO PAIN
PAINLEVEL_OUTOF10: 5 - MODERATE PAIN
PAINLEVEL_OUTOF10: 0 - NO PAIN

## 2024-01-27 ASSESSMENT — PAIN DESCRIPTION - DESCRIPTORS
DESCRIPTORS: CRAMPING
DESCRIPTORS: CRAMPING

## 2024-01-27 ASSESSMENT — PAIN - FUNCTIONAL ASSESSMENT: PAIN_FUNCTIONAL_ASSESSMENT: 0-10

## 2024-01-27 NOTE — ANESTHESIA PREPROCEDURE EVALUATION
Patient: Darcie Monroe    Evaluation Method: In-person visit    Procedure Information    Date: 01/26/24  Procedure: Labor Consult         Relevant Problems   Cardiovascular (within normal limits)      Endocrine   (+) Obesity in pregnancy      GI (within normal limits)      /Renal (within normal limits)      Neuro/Psych   (+) CVA (cerebral vascular accident) (CMS/HCC) (Pt reports possible stroke 2017, pt doesn't believe it, she believes it is bells palsy)   (+) Post traumatic stress disorder (PTSD)      Pulmonary (within normal limits)      GI/Hepatic (within normal limits)      Hematology   (+) Anemia during pregnancy in third trimester      Musculoskeletal (within normal limits)       Clinical information reviewed:   Tobacco  Allergies  Meds   Med Hx  Surg Hx   Fam Hx          NPO Detail:  No data recorded     OB/GYN     Physical Exam    Airway  Mallampati: II  TM distance: >3 FB  Neck ROM: full     Cardiovascular    Dental    Pulmonary    Abdominal            Anesthesia Plan    History of general anesthesia?: yes  History of complications of general anesthesia?: no    ASA 3     epidural   (Epidural and GA R/B discussed.  Questions welcomed.  Pt agrees with plan.)  Anesthetic plan and risks discussed with patient.  Use of blood products discussed with patient who consented to blood products.    Plan discussed with CAA and attending.

## 2024-01-27 NOTE — PROGRESS NOTES
"Intrapartum Progress Note    Assessment/Plan   Darcie Monroe is a 24 y.o.  at 39w1d. LITZY: 2024, by Last Menstrual Period presenting for term IOL.     IOL  -Admitted, consented, scanned - oblique presentation on admission, spontaneously converted to cephalic; rescanned  AM, remains cephalic   - T&Cx2 and hgb on admission 9.9  - Epidural infusing   - GBS pos, for PCN  - s/p AROM at 1600  - Pit @ 26, continue per protocol     Possible history of shoulder dystocia in previous delivery  - Documentation from prior delivery reviewed, delivery note in  explicitly states \"delivered without shoulder dystocia\"  - Per documentation in delivery summary, patient stopped pushing after delivery of head but with coaching eventually delivered remainder of  without difficulty. Baby was 9#6 and had broken clavicle    Fetal Well-Being  - PNLs reviewed and WNL, GBS as above  - CEFM, cat I currently     Postpartum   - Feeding Plan: breast and bottle  - PPBC: Nexplanon, interval BTL     DVT PPx  - SCDs  - Ambulation if able    Seen and discussed with Dr. Jennifer Chandra MD PGY-1  Obstetrics & Gynecology      Principal Problem:    Encounter for elective induction of labor    Pregnancy Problems (from 24 to present)       Problem Noted Resolved    Encounter for elective induction of labor 2024 by Adriana Chandra MD No    Priority:  Medium      BMI 50.0-59.9, adult (CMS/[facility]) 2024 by EZEQUIEL Flores No    Priority:  Medium      Overview Signed 2024  2:29 PM by EZEQUIEL Flores     BMI = 51.59 at NOB  Fetal surveillance BMI >40 at NOB:  Growth US at 30 (50% at 33w) and 36 wks (67%)  BPP or NST weekly 34 wks to delivery    Timing of delivery: 39 0/7 - 39 6/7 wks  *BMI >= 50 at any time in pregnancy: Deliver at Level 4           History of shoulder dystocia in prior pregnancy 2024 by Nidia Gonzalez MD No    Priority:  Medium      Overview Signed 2024  " 4:16 PM by Nidia Gonzalez MD     Patient previously reported hx of shoulder dystocia in prior delivery, however upon chart review. There was no shoulder dystocia. Per documentation in delivery summary, patient stopped pushing after delivery of head but with coaching eventually delivered remainder of  without difficulty. Baby was 9#6 and had broken clavicle, however, previous documentation states no shoulder and no manuvers performed         Obesity in pregnancy 2023 by EZEQUIEL Ledbetter No    Priority:  Medium      Overview Signed 2023  4:31 PM by EZEQUIEL Ledbetter MD CARE ONLY          Elevated glucose level 10/26/2023 by Jeane Nieves No    Priority:  Medium      Overview Addendum 2024  2:26 PM by EZEQUIEL Flores     1hr 145-   3hr ordered ; not done at 38w         High risk multigravida in third trimester 10/26/2023 by Jeane Nieves No    Priority:  Medium      Overview Addendum 2024  2:28 PM by EZEQUIEL Flores     Dating: LMP c/s 7w US  Baby girl [place]  [x] Initial BMI: 50  [x] Prenatal Labs:   [x] Aneuploidy Screening:  CF/SMA neg   [x] Baby ASA:  [x] Anatomy US:  [x] 1hr GCT at 24-28wks: elevated   [] 3 hr - ordered, not done  [x] Tdap (27-36wks):   [x] Flu Shot: declined   [x] COVID vaccine: declined   [x] Rhogam (if Rh neg):  not inidicated  [x] GBS at 36 wks: positive   [x] Breastfeeding and bottle  [x] PPBC: Nexplanon, interval BTL  [x] 39 weeks discussion of IOL vs. Expectant management: desires IOL - RN tasked to schedule         History of pre-eclampsia in prior pregnancy, currently pregnant 10/26/2023 by Jeane Nieves No    Priority:  Medium      Overview Addendum 2024  2:27 PM by EZEQUIEL Flores     HELLP labs WNL   On ASA         Anemia during pregnancy in third trimester 10/26/2023 by Jeane Nieves No    Priority:  Medium       Overview Addendum 1/22/2024  2:12 PM by EZEQUIEL Flores     Lab Results   Component Value Date    WBC 12.8 (H) 12/29/2023    HGB 9.9 (L) 12/29/2023    HCT 32.6 (L) 12/29/2023    MCV 79 (L) 12/29/2023     12/29/2023            37 weeks gestation of pregnancy 12/19/2023 by Nidia Gonzalez MD 1/22/2024 by EZEQUIEL Flores    GERD (gastroesophageal reflux disease) 10/26/2023 by Jeane Nieves 1/22/2024 by EZEQUIEL Flores          Subjective   Resting comfortably in bed. Feeling immense pressure and some pain with contractions.    Objective   Last Vitals:  Temp Pulse Resp BP MAP Pulse Ox   36.5 °C (97.7 °F) 78 18 134/63   100 %     Vitals Min/Max Last 24 Hours:  Temp  Min: 36.2 °C (97.2 °F)  Max: 36.6 °C (97.9 °F)  Pulse  Min: 71  Max: 131  Resp  Min: 16  Max: 18  BP  Min: 91/49  Max: 141/83    Intake/Output:    Intake/Output Summary (Last 24 hours) at 1/27/2024 2017  Last data filed at 1/27/2024 1700  Gross per 24 hour   Intake --   Output 1100 ml   Net -1100 ml       Physical Examination:  GENERAL: well developed, well nourished female in no acute distress  HEENT: clear sclera  NECK: supple  LUNGS: breathing comfortably on room air  HEART: warm and well-perfused  SKIN: no rashes or lesions  NEUROLOGICAL: grossly intact bilaterally  PSYCHOLOGICAL: appropriate affect      Cervical Exam  Dilation: 3  Effacement (%): 60  Fetal Station: -2  OB Examiner: Prateek MORRISSEY  Fetal Assessment  Movement: Present  Mode: External US  Baseline Fetal Heart Rate (bpm): 145 bpm  Baseline Classification: Normal  Variability: Moderate (Between 6 and 25 BPM)  Pattern: Accelerations  Pattern Observations:  (RN at bedside readjusting EFM.)  FHR Category: Category I  Multiple Births: No      Contraction Frequency: 2-4

## 2024-01-27 NOTE — CARE PLAN
The patient's goals for the shift include healthy baby and safe delivery    The clinical goals for the shift include Adequate pain management, FHR remains reassuring during labor process    Patient continues with induction of labor. Report given to SANTY Vogel.

## 2024-01-27 NOTE — ANESTHESIA PROCEDURE NOTES
Epidural Block    Patient location during procedure: OB  Start time: 1/27/2024 12:52 AM  End time: 1/27/2024 1:58 AM  Reason for block: labor analgesia  Staffing  Performed: resident   Authorized by: Calixto Walker MD    Performed by: Calixto Walker MD    Preanesthetic Checklist  Completed: patient identified, IV checked, risks and benefits discussed, surgical consent, pre-op evaluation, timeout performed and sterile techniques followed  Block Timeout  RN/Licensed healthcare professional reads aloud to the Anesthesia provider and entire team: Patient identity, procedure with side and site, patient position, and as applicable the availability of implants/special equipment/special requirements.  Patient on coagulant treatment: no  Timeout performed at: 1/27/2024 12:52 AM  Block Placement  Patient position: sitting  Prep: ChloraPrep  Sterility prep: cap, drape, gloves, hand and mask  Sedation level: no sedation  Patient monitoring: blood pressure, continuous pulse oximetry and heart rate  Approach: midline  Local numbing: lidocaine 1% to skin and subcutaneous tissues  Vertebral space: lumbar  Lumbar location: L4-L5  Epidural  Loss of resistance technique: saline  Guidance: landmark technique        Needle  Needle type: Tuohy   Needle gauge: 17  Needle length: 10.2 cm  Needle insertion depth: 9 cm  Catheter type: multi-orifice  Catheter size: 19 G  Catheter at skin depth: 14 cm  Catheter securement method: clear occlusive dressing    Test dose: lidocaine 1.5% with epinephrine 1-to-200,000  Test dose: lidocaine 1.5% with epinephrine 1-to-200,000  Test dose result: no positive test dose    PCEA  Medication concentration used: 0.044% Bupivacaine with 1.25 mcg/mL Fentanyl and [id number] Epinephrine  Dose (mL): 10  Lockout (minutes): 15  1-Hour Limit (boluses/hr): 4  Basal Rate: 14        Assessment  Sensory level: T6 bilateral  Block outcome: pain improved  Number of attempts: 1  Events: no positive test  dose  Procedure assessment: patient tolerated procedure well with no immediate complications

## 2024-01-27 NOTE — SIGNIFICANT EVENT
"Labor Progress Note    Subjective: Patient resting comfortably in bed. Epidural in place but not infusing.    Objective:  Vitals: /67   Pulse 92   Temp 36.6 °C (97.9 °F) (Temporal)   Resp 18   Ht 1.676 m (5' 6\")   Wt (!) 151 kg (332 lb 14.3 oz)   LMP 04/28/2023   SpO2 100%   BMI 53.73 kg/m²   Cervical Exam  Dilation: 1  Effacement (%): 30  Fetal Station: -3  OB Examiner: Prateek MORRISSEY  Fetal Assessment  Movement: Present  Mode: External US  Baseline Fetal Heart Rate (bpm): 145 bpm  Baseline Classification: Normal  Variability: Moderate (Between 6 and 25 BPM)  Pattern: Other (Comment) (Apparent prolonged deceleration.)  Pattern Observations:  (RN at bedside readjusting EFM.)  FHR Category: Category I  Multiple Births: No     Will start pitocin per protocol at 0600  CEFM, currently Category I  Epidural in place, not infusing    Adriana Chandra MD PGY-1  Obstetrics & Gynecology    "

## 2024-01-27 NOTE — INDIVIDUALIZED OVERALL PLAN OF CARE NOTE
Pt resting comfortably in bed  Cervical Exam  Dilation: 3  Effacement (%): 60  Fetal Station: -3  OB Examiner: Audra MORRISSEY  Fetal Assessment  Movement: Present  Mode: External US  Baseline Fetal Heart Rate (bpm): 145 bpm  Baseline Classification: Normal  Variability: Moderate (Between 6 and 25 BPM)  Pattern: Accelerations, Variable decelerations  Pattern Observations:  (RN at bedside readjusting EFM.)  FHR Category: Category I  Multiple Births: No      Contraction Frequency: 1-4  Pit infusing from 24  Epidural infusing, inadequate pain control  AROMed for clear    Criss Martínez MD PGY1

## 2024-01-27 NOTE — PROGRESS NOTES
"Intrapartum Progress Note    Assessment/Plan   Darcie Monroe is a 24 y.o.  at 39w1d. LITZY: 2024, by Last Menstrual Period presenting for term IOL     IOL  -Admitted, consented, scanned - oblique presentation on admission, spontaneously converted to cephalic; rescanned  AM, remains cephalic   - T&Cx2 and hgb on admission 9.9  - Epidural infusing   - GBS pos, for PCN  - Pit @ 10, continue per protocol   - Ballotable on SVE, will re-evaluate for AROM      Possible history of shoulder dystocia in previous delivery  - Documentation from prior delivery reviewed, delivery note in  explicitly states \"delivered without shoulder dystocia\"  - Per documentation in delivery summary, patient stopped pushing after delivery of head but with coaching eventually delivered remainder of  without difficulty. Baby was 9#6 and had broken clavicle    Fetal Well-Being  - PNLs reviewed and WNL, GBS as above  - CEFM, cat I currently     Postpartum   - Feeding Plan: breast and bottle  - PPBC: Nexplanon, interval BTL     DVT PPx  - SCDs  - Ambulation if able    Seen and discussed with Dr. John Zhu MD     Principal Problem:    Encounter for elective induction of labor  Active Problems:    CVA (cerebral vascular accident) (CMS/[facility])    Pregnancy Problems (from 24 to present)       Problem Noted Resolved    Encounter for elective induction of labor 2024 by Adriana Chandra MD No    Priority:  Medium      BMI 50.0-59.9, adult (CMS/[facility]) 2024 by EZEQUIEL Flores No    Priority:  Medium      Overview Signed 2024  2:29 PM by EZEQUIEL Flores     BMI = 51.59 at NOB  Fetal surveillance BMI >40 at NOB:  Growth US at 30 (50% at 33w) and 36 wks (67%)  BPP or NST weekly 34 wks to delivery    Timing of delivery: 39 0/7 - 39 6/7 wks  *BMI >= 50 at any time in pregnancy: Deliver at Level 4           History of shoulder dystocia in prior pregnancy 2024 by Nidia " MD Carlos No    Priority:  Medium      Overview Signed 2024  4:16 PM by Nidia Gonzalez MD     Patient previously reported hx of shoulder dystocia in prior delivery, however upon chart review. There was no shoulder dystocia. Per documentation in delivery summary, patient stopped pushing after delivery of head but with coaching eventually delivered remainder of  without difficulty. Baby was 9#6 and had broken clavicle, however, previous documentation states no shoulder and no manuvers performed         Obesity in pregnancy 2023 by EZEQUIEL Ledbetter No    Priority:  Medium      Overview Signed 2023  4:31 PM by EZEQUIEL Ledbetter MD CARE ONLY          Elevated glucose level 10/26/2023 by Jeane Nieevs No    Priority:  Medium      Overview Addendum 2024  2:26 PM by EZEQUIEL Flores     1hr 145-   3hr ordered ; not done at 38w         High risk multigravida in third trimester 10/26/2023 by Jeane Nieves No    Priority:  Medium      Overview Addendum 2024  2:28 PM by EZEQUIEL Flores     Dating: LMP c/s 7w US  Baby girl [place]  [x] Initial BMI: 50  [x] Prenatal Labs:   [x] Aneuploidy Screening:  CF/SMA neg   [x] Baby ASA:  [x] Anatomy US:  [x] 1hr GCT at 24-28wks: elevated   [] 3 hr - ordered, not done  [x] Tdap (27-36wks):   [x] Flu Shot: declined   [x] COVID vaccine: declined   [x] Rhogam (if Rh neg):  not inidicated  [x] GBS at 36 wks: positive   [x] Breastfeeding and bottle  [x] PPBC: Nexplanon, interval BTL  [x] 39 weeks discussion of IOL vs. Expectant management: desires IOL - RN tasked to schedule         History of pre-eclampsia in prior pregnancy, currently pregnant 10/26/2023 by Jeane Nieves No    Priority:  Medium      Overview Addendum 2024  2:27 PM by EZEQUIEL Flores     HELLP labs WNL   On ASA         Anemia during pregnancy in third trimester  10/26/2023 by Jeane Nieves No    Priority:  Medium      Overview Addendum 1/22/2024  2:12 PM by EZEQUIEL Flores     Lab Results   Component Value Date    WBC 12.8 (H) 12/29/2023    HGB 9.9 (L) 12/29/2023    HCT 32.6 (L) 12/29/2023    MCV 79 (L) 12/29/2023     12/29/2023            37 weeks gestation of pregnancy 12/19/2023 by Nidia Gonzalez MD 1/22/2024 by EZEQUIEL Flores    GERD (gastroesophageal reflux disease) 10/26/2023 by Jeane Nieves 1/22/2024 by EZEQUIEL Flores          Subjective   Resting comfortably in bed.     Objective   Last Vitals:  Temp Pulse Resp BP MAP Pulse Ox   36.5 °C (97.7 °F) 85 18 128/76   97 %     Vitals Min/Max Last 24 Hours:  Temp  Min: 36.1 °C (97 °F)  Max: 36.7 °C (98.1 °F)  Pulse  Min: 71  Max: 131  Resp  Min: 18  Max: 20  BP  Min: 91/49  Max: 141/83    Intake/Output:  No intake or output data in the 24 hours ending 01/27/24 1128    Physical Examination:  GENERAL: well developed, well nourished female in no acute distress  HEENT: clear sclera  NECK: supple  LUNGS: breathing comfortably on room air  HEART: warm and well-perfused  SKIN: no rashes or lesions  NEUROLOGICAL: grossly intact bilaterally  PSYCHOLOGICAL: appropriate affect      Cervical Exam  Dilation: 3  Effacement (%): 60  Fetal Station: -3  OB Examiner: Prateek MORRISSEY  Fetal Assessment  Movement: Present  Mode:  (having difficulty tracing FHR d/t pt positioning. RN at bedside adjusting US.)  Baseline Fetal Heart Rate (bpm): 150 bpm  Baseline Classification: Normal  Variability: Moderate (Between 6 and 25 BPM)  Pattern: Accelerations  Pattern Observations:  (RN at bedside readjusting EFM.)  FHR Category: Category I  Multiple Births: No      Contraction Frequency: 1-3

## 2024-01-27 NOTE — H&P
"Obstetrical Admission History and Physical     Darcie Monroe is a 24 y.o.  @ 39w0d. Estimated Date of Delivery: 24. Estimated fetal weight: 3776g (extrapolated from  US). She has had prenatal care at [place].    Chief Complaint: No chief complaint on file.    Assessment/Plan    24 y.o.  @ 39w0d by LMP c/w 7wk US presenting for induction of labor for Pregnancy at 39 weeks or greater for induction.     IOL  -Admitted, consented, scanned - oblique presentation  -Options for delivery have been discussed with the patient, including external cephalic version and  section, and she elects for an external cephalic version. The risks, benefits, complications, alternatives, expected outcomes, potential problems during recuperation and recovery, and the risks of not performing the procedure were discussed with the patient. All questions were answered.  -Delivery Plan: patient desires ECV, patient counseled on risks and benefits of ECV and possibility of c/s for non-reassuring fetal status  -NPO time 0100 on   -T&Cx2 and hgb on admission 9.9  -Epidural to be placed prior to ECV  -GBS pos, for PCN    ?H/o shoulder dystocia  -Documentation from prior delivery reviewed; delivery note in  explicitly states \"delivered without shoulder dystocia\"    Fetal Well-Being  -PNLs reviewed and WNL, GBS as above  -CEFM, cat I currently    Postpartum   -Feeding Plan: breast and bottle  -PPBC: Nexplanon, interval BTL    DVT PPx  -SCDs  -Ambulation if able    Seen and discussed w/ Dr. Caicedo and Dr. Devika Chandra MD PGY-1  Obstetrics & Gynecology      Medical Problems       Problem List       * (Principal) Encounter for elective induction of labor    Elevated glucose level    Overview Addendum 2024  2:26 PM by LUIS MANUEL Flores-ALLEN     1hr 145-   3hr ordered ; not done at 38w         High risk multigravida in third trimester    Overview Addendum 2024  2:28 PM by Marcelina " EZEQUIEL Dempsey     Dating: LMP c/s 7w US  Baby girl [place]  [x] Initial BMI: 50  [x] Prenatal Labs:   [x] Aneuploidy Screening:  CF/SMA neg   [x] Baby ASA:  [x] Anatomy US:  [x] 1hr GCT at 24-28wks: elevated   [] 3 hr - ordered, not done  [x] Tdap (27-36wks):   [x] Flu Shot: declined   [x] COVID vaccine: declined   [x] Rhogam (if Rh neg):  not inidicated  [x] GBS at 36 wks: positive   [x] Breastfeeding and bottle  [x] PPBC: Nexplanon, interval BTL  [x] 39 weeks discussion of IOL vs. Expectant management: desires IOL - RN tasked to schedule         History of pre-eclampsia in prior pregnancy, currently pregnant    Overview Addendum 2024  2:27 PM by EZEQUIEL Flores     HELLP labs WNL   On ASA         Anemia during pregnancy in third trimester    Overview Addendum 2024  2:12 PM by EZEQUIEL Flores     Lab Results   Component Value Date    WBC 12.8 (H) 2023    HGB 9.9 (L) 2023    HCT 32.6 (L) 2023    MCV 79 (L) 2023     2023            Elevated blood-pressure reading without diagnosis of hypertension    Obesity in pregnancy    Overview Signed 2023  4:31 PM by EZEQUIEL Ledbetter MD CARE ONLY          ADHD (attention deficit hyperactivity disorder)    Post traumatic stress disorder (PTSD)    History of shoulder dystocia in prior pregnancy    Overview Signed 2024  4:16 PM by Nidia Gonazlez MD     Patient previously reported hx of shoulder dystocia in prior delivery, however upon chart review. There was no shoulder dystocia. Per documentation in delivery summary, patient stopped pushing after delivery of head but with coaching eventually delivered remainder of  without difficulty. Baby was 9#6 and had broken clavicle, however, previous documentation states no shoulder and no manuvers performed         BMI 50.0-59.9, adult (CMS/[facility])    Overview Signed 2024  2:29 PM by Marcelina  Roselyn, APRN-CNM     BMI = 51.59 at NOB  Fetal surveillance BMI >40 at NOB:  Growth US at 30 (50% at 33w) and 36 wks (67%)  BPP or NST weekly 34 wks to delivery    Timing of delivery: 39 0/7 - 39 6/7 wks  *BMI >= 50 at any time in pregnancy: Deliver at Level 4           Hx of Bell's palsy    Heart disease, unspecified    Personal history of transient ischemic attack (TIA), and cerebral infarction without residual deficits    CVA (cerebral vascular accident) (CMS/HCC)          Subjective   Good fetal movement. Denies vaginal bleeding., Denies contractions., Denies leaking of fluid.        Obstetrical History   OB History    Para Term  AB Living   2 1 1 0 0 1   SAB IAB Ectopic Multiple Live Births   0 0 0 0 1      # Outcome Date GA Lbr Marcelino/2nd Weight Sex Delivery Anes PTL Lv   2 Current            1 Term 20 39w1d  4.252 kg M Vag-Spont EPI N LUIS ANTONIO      Obstetric Comments   2023 1:19 PM -   Order for Compression stockings sent to [address] CODEY Chapa RN        2023 3:27 PM - MS  rx for breast pump sent to [employer]. jg stimjasmin rn       2023 12:09 PM -   Maternity Belt order faxed to [address] [place] [address] Way CODEY Chapa RN        Past Medical History  Past Medical History:   Diagnosis Date    Acanthosis nigricans 2017    Acanthosis nigricans    ADHD     Heart disease, unspecified 2018    Left ventricular dysfunction    History of pre-eclampsia     Hx of chlamydia infection     Hx of trichomoniasis     Migraine, unspecified, not intractable, without status migrainosus 2018    Headache, migraine    Morbid (severe) obesity due to excess calories (CMS/HCC) 2016    Morbid obesity    Other specified abnormal findings of blood chemistry 10/09/2018    Elevated TSH    Patellofemoral disorders, unspecified knee 2017    Patellofemoral syndrome    Personal history of other benign neoplasm 2017    History of lymphangioma    Personal history of other diseases of  the musculoskeletal system and connective tissue 11/14/2017    History of genu valgum    Personal history of other diseases of the nervous system and sense organs 11/28/2018    History of Bell's palsy    Personal history of other endocrine, nutritional and metabolic disease 03/01/2016    History of insulin resistance    Personal history of transient ischemic attack (TIA), and cerebral infarction without residual deficits     History of cerebrovascular accident    Vitamin D deficiency, unspecified 03/30/2021    Vitamin D deficiency        Past Surgical History   Past Surgical History:   Procedure Laterality Date    ABDOMINAL SURGERY  2017    MR HEAD ANGIO W AND WO IV CONTRAST  11/10/2018    MR HEAD ANGIO W AND WO IV CONTRAST 11/10/2018 [facility]    MR NECK ANGIO WO IV CONTRAST  11/10/2018    MR NECK ANGIO WO IV CONTRAST 11/10/2018 [facility]    OTHER SURGICAL HISTORY  02/09/2015    Tonsillectomy Over Age 12       Medications  Medications Prior to Admission   Medication Sig Dispense Refill Last Dose    blood pressure test kit-large kit 1 Units once daily. 1 each 0     famotidine (Pepcid) 20 mg tablet Take 1 tablet (20 mg) by mouth 2 times a day. 90 tablet 2     ferrous sulfate, 325 mg ferrous sulfate, tablet Take 1 tablet by mouth 2 times a day. 30 tablet 2     polyethylene glycol (Glycolax, Miralax) 17 gram/dose powder MIX 1 CAPFUL (17GM) IN 8 OUNCES OF WATER, JUICE, OR TEA AND DRINK DAILY. 510 g 3     prenatal vitamin, iron-folic, 27 mg iron-800 mcg folic acid tablet TAKE 1 TABLET DAILY. 30 tablet 4        Allergies  Patient has no known allergies.     Family History  Family History   Problem Relation Name Age of Onset    Other (CARDIAC DEFIBRILLATOR) Mother      Cardiomyopathy Mother      Heart failure Mother      Obesity Mother          MORBID    Cardiomyopathy Mother's Brother      Heart failure Mother's Brother      Cardiomyopathy Maternal Grandmother      Heart failure Maternal Grandmother       Cardiomyopathy Maternal Grandfather      Heart failure Maternal Grandfather      Other (CARDIAC DEFIBRILLATOR) Other MATERNAL RELATIVES     Cardiomyopathy Other MATERNAL RELATIVES     Heart failure Other MATERNAL RELATIVES     Other (LEFT VENTRICULAR ASSIST DEVICE) Other MATERNAL RELATIVES     Arthritis Other OTHER     Asthma Other OTHER     Heart failure Other OTHER     Hypertension Other OTHER     Sleep apnea Other OTHER        Social History  Social History     Tobacco Use    Smoking status: Former     Types: Cigarettes, Pipe    Smokeless tobacco: Never   Substance Use Topics    Alcohol use: Never     Substance and Sexual Activity   Drug Use Not Currently    Types: Marijuana       Objective    Last Vitals  Temp Pulse Resp BP MAP O2 Sat   36.7 °C (98.1 °F) 104 18 115/69   100 %     Physical Examination  GENERAL: Examination reveals a well developed, well nourished, gravid female in no acute distress. She is alert and cooperative.  HEENT: External ears normal. Nose normal, no erythema or discharge. Mouth and throat clear.  NECK: supple, no significant adenopathy  LUNGS: Normal respiratory effort  HEART: RRR  ABDOMEN: Gravid  VAGINA: normal appearing vagina with normal color and discharge and no lesions noted  EXTREMITIES: no limitation in range of motion  SKIN: normal coloration and turgor, no rashes  NEUROLOGICAL: alert, oriented, normal speech, no focal findings or movement disorder noted  PSYCHOLOGICAL: awake and alert; oriented to person, place, and time    Lab Review  Lab Results   Component Value Date    WBC 11.9 (H) 01/26/2024    HGB 9.9 (L) 01/26/2024    HCT 31.9 (L) 01/26/2024     01/26/2024

## 2024-01-28 LAB
ALBUMIN SERPL BCP-MCNC: 3.1 G/DL (ref 3.4–5)
ALP SERPL-CCNC: 168 U/L (ref 33–110)
ALT SERPL W P-5'-P-CCNC: 16 U/L (ref 7–45)
ANION GAP SERPL CALC-SCNC: 12 MMOL/L (ref 10–20)
AST SERPL W P-5'-P-CCNC: 19 U/L (ref 9–39)
BILIRUB SERPL-MCNC: 0.5 MG/DL (ref 0–1.2)
BUN SERPL-MCNC: 6 MG/DL (ref 6–23)
CALCIUM SERPL-MCNC: 8.6 MG/DL (ref 8.6–10.6)
CHLORIDE SERPL-SCNC: 106 MMOL/L (ref 98–107)
CO2 SERPL-SCNC: 22 MMOL/L (ref 21–32)
CREAT SERPL-MCNC: 0.6 MG/DL (ref 0.5–1.05)
EGFRCR SERPLBLD CKD-EPI 2021: >90 ML/MIN/1.73M*2
GLUCOSE SERPL-MCNC: 66 MG/DL (ref 74–99)
POTASSIUM SERPL-SCNC: 3.9 MMOL/L (ref 3.5–5.3)
PROT SERPL-MCNC: 6.2 G/DL (ref 6.4–8.2)
SODIUM SERPL-SCNC: 136 MMOL/L (ref 136–145)

## 2024-01-28 PROCEDURE — 1210000001 HC SEMI-PRIVATE ROOM DAILY

## 2024-01-28 PROCEDURE — 2500000005 HC RX 250 GENERAL PHARMACY W/O HCPCS: Performed by: STUDENT IN AN ORGANIZED HEALTH CARE EDUCATION/TRAINING PROGRAM

## 2024-01-28 PROCEDURE — 2500000001 HC RX 250 WO HCPCS SELF ADMINISTERED DRUGS (ALT 637 FOR MEDICARE OP)

## 2024-01-28 PROCEDURE — 2500000004 HC RX 250 GENERAL PHARMACY W/ HCPCS (ALT 636 FOR OP/ED): Performed by: STUDENT IN AN ORGANIZED HEALTH CARE EDUCATION/TRAINING PROGRAM

## 2024-01-28 PROCEDURE — 36415 COLL VENOUS BLD VENIPUNCTURE: CPT | Performed by: STUDENT IN AN ORGANIZED HEALTH CARE EDUCATION/TRAINING PROGRAM

## 2024-01-28 PROCEDURE — 2500000001 HC RX 250 WO HCPCS SELF ADMINISTERED DRUGS (ALT 637 FOR MEDICARE OP): Performed by: STUDENT IN AN ORGANIZED HEALTH CARE EDUCATION/TRAINING PROGRAM

## 2024-01-28 PROCEDURE — 80053 COMPREHEN METABOLIC PANEL: CPT | Performed by: STUDENT IN AN ORGANIZED HEALTH CARE EDUCATION/TRAINING PROGRAM

## 2024-01-28 RX ORDER — LABETALOL HYDROCHLORIDE 5 MG/ML
20 INJECTION, SOLUTION INTRAVENOUS ONCE AS NEEDED
Status: DISCONTINUED | OUTPATIENT
Start: 2024-01-28 | End: 2024-01-30 | Stop reason: HOSPADM

## 2024-01-28 RX ORDER — OXYCODONE HYDROCHLORIDE 5 MG/1
5 TABLET ORAL ONCE
Status: COMPLETED | OUTPATIENT
Start: 2024-01-28 | End: 2024-01-28

## 2024-01-28 RX ORDER — DIPHENHYDRAMINE HCL 25 MG
25 CAPSULE ORAL EVERY 6 HOURS PRN
Status: DISCONTINUED | OUTPATIENT
Start: 2024-01-28 | End: 2024-01-30 | Stop reason: HOSPADM

## 2024-01-28 RX ORDER — SIMETHICONE 80 MG
80 TABLET,CHEWABLE ORAL 4 TIMES DAILY PRN
Status: DISCONTINUED | OUTPATIENT
Start: 2024-01-28 | End: 2024-01-30 | Stop reason: HOSPADM

## 2024-01-28 RX ORDER — OXYTOCIN 10 [USP'U]/ML
10 INJECTION, SOLUTION INTRAMUSCULAR; INTRAVENOUS ONCE AS NEEDED
Status: DISCONTINUED | OUTPATIENT
Start: 2024-01-28 | End: 2024-01-30 | Stop reason: HOSPADM

## 2024-01-28 RX ORDER — MISOPROSTOL 200 UG/1
800 TABLET ORAL ONCE AS NEEDED
Status: DISCONTINUED | OUTPATIENT
Start: 2024-01-28 | End: 2024-01-30 | Stop reason: HOSPADM

## 2024-01-28 RX ORDER — ONDANSETRON HYDROCHLORIDE 2 MG/ML
4 INJECTION, SOLUTION INTRAVENOUS EVERY 6 HOURS PRN
Status: DISCONTINUED | OUTPATIENT
Start: 2024-01-28 | End: 2024-01-30 | Stop reason: HOSPADM

## 2024-01-28 RX ORDER — DIPHENHYDRAMINE HYDROCHLORIDE 50 MG/ML
25 INJECTION INTRAMUSCULAR; INTRAVENOUS EVERY 6 HOURS PRN
Status: DISCONTINUED | OUTPATIENT
Start: 2024-01-28 | End: 2024-01-30 | Stop reason: HOSPADM

## 2024-01-28 RX ORDER — BISACODYL 10 MG/1
10 SUPPOSITORY RECTAL DAILY PRN
Status: DISCONTINUED | OUTPATIENT
Start: 2024-01-28 | End: 2024-01-30 | Stop reason: HOSPADM

## 2024-01-28 RX ORDER — LIDOCAINE 560 MG/1
1 PATCH PERCUTANEOUS; TOPICAL; TRANSDERMAL
Status: DISCONTINUED | OUTPATIENT
Start: 2024-01-28 | End: 2024-01-30 | Stop reason: HOSPADM

## 2024-01-28 RX ORDER — OXYTOCIN/0.9 % SODIUM CHLORIDE 30/500 ML
60 PLASTIC BAG, INJECTION (ML) INTRAVENOUS ONCE AS NEEDED
Status: DISCONTINUED | OUTPATIENT
Start: 2024-01-28 | End: 2024-01-30 | Stop reason: HOSPADM

## 2024-01-28 RX ORDER — TRANEXAMIC ACID 100 MG/ML
1000 INJECTION, SOLUTION INTRAVENOUS ONCE AS NEEDED
Status: DISCONTINUED | OUTPATIENT
Start: 2024-01-28 | End: 2024-01-30 | Stop reason: HOSPADM

## 2024-01-28 RX ORDER — ADHESIVE BANDAGE
30 BANDAGE TOPICAL
Status: DISCONTINUED | OUTPATIENT
Start: 2024-01-28 | End: 2024-01-30 | Stop reason: HOSPADM

## 2024-01-28 RX ORDER — POLYETHYLENE GLYCOL 3350 17 G/17G
17 POWDER, FOR SOLUTION ORAL 2 TIMES DAILY PRN
Status: DISCONTINUED | OUTPATIENT
Start: 2024-01-28 | End: 2024-01-30 | Stop reason: HOSPADM

## 2024-01-28 RX ORDER — HYDRALAZINE HYDROCHLORIDE 20 MG/ML
5 INJECTION INTRAMUSCULAR; INTRAVENOUS ONCE AS NEEDED
Status: DISCONTINUED | OUTPATIENT
Start: 2024-01-28 | End: 2024-01-30 | Stop reason: HOSPADM

## 2024-01-28 RX ORDER — NIFEDIPINE 10 MG/1
10 CAPSULE ORAL ONCE AS NEEDED
Status: DISCONTINUED | OUTPATIENT
Start: 2024-01-28 | End: 2024-01-30 | Stop reason: HOSPADM

## 2024-01-28 RX ORDER — CARBOPROST TROMETHAMINE 250 UG/ML
250 INJECTION, SOLUTION INTRAMUSCULAR ONCE AS NEEDED
Status: DISCONTINUED | OUTPATIENT
Start: 2024-01-28 | End: 2024-01-30 | Stop reason: HOSPADM

## 2024-01-28 RX ORDER — ENOXAPARIN SODIUM 100 MG/ML
80 INJECTION SUBCUTANEOUS EVERY 24 HOURS
Status: DISCONTINUED | OUTPATIENT
Start: 2024-01-28 | End: 2024-01-30 | Stop reason: HOSPADM

## 2024-01-28 RX ORDER — ONDANSETRON 4 MG/1
4 TABLET, FILM COATED ORAL EVERY 6 HOURS PRN
Status: DISCONTINUED | OUTPATIENT
Start: 2024-01-28 | End: 2024-01-30 | Stop reason: HOSPADM

## 2024-01-28 RX ORDER — LOPERAMIDE HYDROCHLORIDE 2 MG/1
4 CAPSULE ORAL EVERY 2 HOUR PRN
Status: DISCONTINUED | OUTPATIENT
Start: 2024-01-28 | End: 2024-01-30 | Stop reason: HOSPADM

## 2024-01-28 RX ORDER — METHYLERGONOVINE MALEATE 0.2 MG/ML
0.2 INJECTION INTRAVENOUS ONCE AS NEEDED
Status: DISCONTINUED | OUTPATIENT
Start: 2024-01-28 | End: 2024-01-30 | Stop reason: HOSPADM

## 2024-01-28 RX ADMIN — ACETAMINOPHEN 975 MG: 325 TABLET ORAL at 04:22

## 2024-01-28 RX ADMIN — OXYCODONE HYDROCHLORIDE 5 MG: 5 TABLET ORAL at 06:01

## 2024-01-28 RX ADMIN — POLYETHYLENE GLYCOL 3350 17 G: 17 POWDER, FOR SOLUTION ORAL at 15:55

## 2024-01-28 RX ADMIN — Medication 1 EACH: at 10:23

## 2024-01-28 RX ADMIN — IBUPROFEN 600 MG: 600 TABLET ORAL at 10:23

## 2024-01-28 RX ADMIN — ACETAMINOPHEN 975 MG: 325 TABLET ORAL at 10:23

## 2024-01-28 RX ADMIN — BENZOCAINE AND LEVOMENTHOL 1 APPLICATION: 200; 5 SPRAY TOPICAL at 10:23

## 2024-01-28 RX ADMIN — ACETAMINOPHEN 975 MG: 325 TABLET ORAL at 21:31

## 2024-01-28 RX ADMIN — IBUPROFEN 600 MG: 600 TABLET ORAL at 21:31

## 2024-01-28 RX ADMIN — ACETAMINOPHEN 975 MG: 325 TABLET ORAL at 15:55

## 2024-01-28 RX ADMIN — IBUPROFEN 600 MG: 600 TABLET ORAL at 04:22

## 2024-01-28 RX ADMIN — ENOXAPARIN SODIUM 80 MG: 80 INJECTION SUBCUTANEOUS at 21:31

## 2024-01-28 RX ADMIN — IBUPROFEN 600 MG: 600 TABLET ORAL at 15:55

## 2024-01-28 ASSESSMENT — PAIN DESCRIPTION - DESCRIPTORS: DESCRIPTORS: SORE

## 2024-01-28 ASSESSMENT — PAIN - FUNCTIONAL ASSESSMENT: PAIN_FUNCTIONAL_ASSESSMENT: 0-10

## 2024-01-28 ASSESSMENT — PAIN SCALES - GENERAL
PAINLEVEL_OUTOF10: 4
PAINLEVEL_OUTOF10: 10 - WORST POSSIBLE PAIN
PAINLEVEL_OUTOF10: 0 - NO PAIN
PAINLEVEL_OUTOF10: 10 - WORST POSSIBLE PAIN
PAINLEVEL_OUTOF10: 0 - NO PAIN
PAINLEVEL_OUTOF10: 0 - NO PAIN
PAIN_LEVEL: 4

## 2024-01-28 NOTE — LACTATION NOTE
Lactation Consultant Note  Lactation Consultation  Reason for Consult: Follow-up assessment, Other (Comment) (latching help)  Consultant Name: Darcie Ashraf RN, IBCLC    Maternal Information  Has mother  before?: No (she attempted but, the baby wouldn't latch)  Infant to breast within first 2 hours of birth?: Yes  Exclusive Pump and Bottle Feed: No (pumping d/t supplementing with formula)    Maternal Assessment  Breast Assessment: Large, Soft, Compressible, Other (Comment) (expressible)  Nipple Assessment: Intact, Erect  Areola Assessment: Normal    Infant Assessment  Infant Behavior: Feeding cues observed, Suckles on and off, needs stimulation  Infant Assessment: Palate - high/arch/bubble/normal, Good cupping of tongue, Tongue protrudes over alveolar ridge    Feeding Assessment  Nutrition Source: Breastmilk  Feeding Method: Nursing at the breast  Unable to assess infant feeding at this time: Other (Comment) (baby fed formula at 0630- no feeding cues seen)  Feeding Position: C - hold, Football/seated, Skin to skin, One side per feeding, Nipple to nose, Mother needs assistance with latch/positioning  Suck/Feeding: Sustained, Coordinated suck/swallow/breathe, Tactile stimulation needed, Audible swallowing with stimulaton  Latch Assessment: Minimal assistance is needed, Instructed on deep latch, Areolar attachment, Deep latch obtained, Wide open mouth < 160, Comfortable with no pain, Flanged lips, Bursts of sucking, swallowing, and rest, Chin and lower lip contact breast first    LATCH TOOL  Latch: Repeated attempts, hold nipple in mouth, stimulate to suck  Audible Swallowing: A few with stimulation  Type of Nipple: Everted (After stimulation)  Comfort (Breast/Nipple): Soft/non-tender  Hold (Positioning): Minimal assist, teach one side, mother does other, staff holds  LATCH Score: 7    Breast Pump  Pump: Hospital grade electric pump, Single breast pumping, Massage  Frequency: Less than 3 times per  day  Duration: Initiate phase  Breast Shield Size and Type: 21 mm  Volume of Milk Production: 5  Units of Volume: mL per session    Other OB Lactation Tools  Lactation Tools: Flanges    Patient Follow-up  Inpatient Lactation Follow-up Needed : Yes  Outpatient Lactation Follow-up: Recommended    Other OB Lactation Documentation  Maternal Risk Factors: Obesity (pre-pregnancy BMI >30)  Infant Risk Factors: Early term birth 37-39 weeks    Recommendations/Summary  Mom was able to obtain 5 Ml of pumped breast milk that she gave via bottle and then she wanted to latch the baby to the breast.   Offered to assist with this and mom was receptive. (Baby still showing feeding cues).     Reviewed positioning of baby (in football  hold on the right), use of pillow support, hand placement on baby and on breast, expression of colostrum to the nipple prior to latching(mom very expressible), latching technique, and use of breast compression and stimulation to keep the baby feeding at the breast longer.  Deep latch obtained and mom stated latch as comfortable. Noted swallows.   Mom stated the cramping was uncomfortable- reviewed emptying of the bladder prior to pumping or latching,and use of pain medication, warm packs, ect.     Encouraged mom to breastfeed on demand with a goal of 8-12 feeds in a 24 hour period.   If baby is not showing feeding cues and it has been 3 hours since the last time the baby was fed or the last time the baby attempted to feed, encouraged her to place baby in skin to skin.    If mom decides to not latch to the breast or she supplements with formula; encouraged her to pump for 20 minutes on both breasts and to give the pumped breast milk prior to any formula use.   Once her full milk supply comes in and baby is latching consistently to the breast; she does not need to pump.     Questions answered.   Encouraged her to call for assistance, if needed.

## 2024-01-28 NOTE — CARE PLAN
Problem: Postpartum  Goal: Experiences normal postpartum course  Outcome: Progressing  Goal: Appropriate maternal -  bonding  Outcome: Progressing  Goal: Establish and maintain infant feeding pattern for adequate nutrition  Outcome: Progressing  Goal: No s/sx infection  Outcome: Progressing  Goal: No s/sx of hemorrhage  Outcome: Progressing     Problem: Safety - Adult  Goal: Free from fall injury  Outcome: Progressing

## 2024-01-28 NOTE — DISCHARGE INSTRUCTIONS

## 2024-01-28 NOTE — ANESTHESIA PROCEDURE NOTES
Epidural Block    Patient location during procedure: OB  Start time: 1/27/2024 7:34 PM  End time: 1/27/2024 8:18 PM  Reason for block: labor analgesia  Staffing  Performed: resident   Authorized by: Steve Russo MD    Performed by: Louie Paniagua MD    Preanesthetic Checklist  Completed: patient identified, IV checked, risks and benefits discussed, surgical consent, pre-op evaluation, timeout performed and sterile techniques followed  Block Timeout  RN/Licensed healthcare professional reads aloud to the Anesthesia provider and entire team: Patient identity, procedure with side and site, patient position, and as applicable the availability of implants/special equipment/special requirements.  Patient on coagulant treatment: no  Timeout performed at: 1/27/2024 7:34 PM  Block Placement  Patient position: sitting  Prep: ChloraPrep  Sterility prep: cap, gloves, drape, hand and mask  Sedation level: no sedation  Patient monitoring: blood pressure, continuous pulse oximetry and heart rate  Approach: midline  Local numbing: lidocaine 1% to skin and subcutaneous tissues  Vertebral space: lumbar  Lumbar location: L3-L4  Epidural  Loss of resistance technique: saline  Guidance: landmark technique        Needle  Needle type: Tuohy   Needle gauge: 17  Needle length: 10.2 cm  Needle insertion depth: 10.2 cm  Catheter type: multi-orifice  Catheter size: 22 G  Catheter at skin depth: 14.5 cm  Catheter securement method: clear occlusive dressing, liquid medical adhesive and surgical tape    Test dose: lidocaine 1.5% with epinephrine 1-to-200,000  Test dose: lidocaine 1.5% with epinephrine 1-to-200,000  Test dose result: no positive test dose    PCEA  Medication concentration used: 0.044% Bupivacaine with 1.25 mcg/mL Fentanyl and [id number] Epinephrine  Dose (mL): 10  Lockout (minutes): 15  1-Hour Limit (boluses/hr): 4  Basal Rate: 14        Assessment  Sensory level: other (T11 R, no ascertainable level on the L)  Number of  attempts: 2  Events: no positive test dose  Procedure assessment: patient tolerated procedure well with no immediate complications  Additional Notes  First attempt by resident unsuccessful due to inability to pass catheter after MICHELLE with catheter hubbed. 2nd attempt by attending with MICHELLE and successful passage of epidural catheter through touhy. Will continue to reassess level.

## 2024-01-28 NOTE — L&D DELIVERY NOTE
OB Delivery Note  2024  Darcie Monroe  24 y.o.   Vaginal, Spontaneous      Normal spontaneous vaginal delivery. Placenta delivered spontaneously. EBL 150cc.    Gestational Age: 39w1d  /Para:   Quantitative Blood Loss: Admission to Discharge: 172 mL (2024  5:13 PM - 2024 11:01 PM)    Gayatri Monroe [[id number]]      Labor Events    Sac identifier: Sac 1  Rupture date/time: 2024 1600  Rupture type: Artificial  Fluid color: Clear  Fluid odor: None  Labor type: Induced Onset of Labor  Labor allowed to proceed with plans for an attempted vaginal birth?: Yes  Induction: Oxytocin, Misoprostol  First cervical ripening date/time: 2024  Induction date/time: 2024  Complications: None       Labor Event Times    Labor onset date/time: 2024  Dilation complete date/time: 2024  Start pushing date/time: 2024       Placenta    Placenta delivery date/time: 2024  Placenta removal: Spontaneous  Placenta appearance: Intact  Placenta disposition: discarded       Cord    Vessels: 3 vessels  Complications: None  Delayed cord clamping?: Yes  Cord clamped date/time: 2024  Cord blood disposition: Discarded  Gases sent?: No  Stem cell collection (by provider): No       Lacerations    Episiotomy: None  Other lacerations?: No       Anesthesia    Method: Epidural       Operative Delivery    Forceps attempted?: No  Vacuum extractor attempted?: No       Shoulder Dystocia    Shoulder dystocia present?: No       [place] Delivery    Time head delivered: 2024 21:03:00  Birth date/time: 2024 21:03:00  Delivery type: Vaginal, Spontaneous  Complications: None       Resuscitation    Method: Tactile stimulation, Suctioning       Apgars    Living status: Living  Apgar Component Scores:  1 min.:  5 min.:  10 min.:  15 min.:  20 min.:    Skin color:  0  1       Heart rate:  2  2       Reflex irritability:  2  2       Muscle tone:  2  2        Respiratory effort:  2  2       Total:  8  9       Apgars assigned by: RENAN PRATHER       Delivery Providers    Delivering clinician: Aimee ONTIVEROS MD   Provider Role    Bibi Lazaro RN Delivery Nurse    Jade Prather RN Nursery Nurse    Adriana Chandra MD Resident    Sonia Edwards Technician                 Adriana Chandra MD

## 2024-01-28 NOTE — ANESTHESIA POSTPROCEDURE EVALUATION
Patient: Darcie Monroe    Procedure Summary       Date: 01/27/24 Room / Location:     Anesthesia Start: 0052 Anesthesia Stop: 2103    Procedure: Labor Analgesia Diagnosis:     Scheduled Providers:  Responsible Provider: Steve Russo MD    Anesthesia Type: epidural ASA Status: 3            Anesthesia Type: epidural    Vitals Value Taken Time   /78 01/28/24 0746   Temp 36.4 01/28/24 0746   Pulse 76 01/28/24 0746   Resp 17 01/28/24 0746   SpO2 98 01/28/24 0746       Anesthesia Post Evaluation    Patient location during evaluation: floor  Patient participation: complete - patient participated  Level of consciousness: awake  Pain score: 4  Pain management: adequate  Airway patency: patent  Cardiovascular status: acceptable  Respiratory status: acceptable  Hydration status: acceptable  Postoperative Nausea and Vomiting: none    Patient reports mild sourness in lower back, no weakness in lower extremities, no urinary retension.    No notable events documented.

## 2024-01-28 NOTE — CARE PLAN
The patient's goals for the shift include healthy baby and safe delivery    The clinical goals for the shift include healthy mom and baby    Patient transferred to [facility]. Patient continues with plan of care.

## 2024-01-28 NOTE — PROGRESS NOTES
Postpartum Progress Note    Assessment/Plan   Darcie Monroe is a 24 y.o., , who was admitted for term IOL, delivered at 39w1d gestation and is now postpartum day 1 s/p .     Routine postpartum care  - meeting all milestones  - no laceration,  mL  - bonding with female infant  - pain well controlled on po medications  - DVT Score: 5 - encourage ambulation,  SCDs, and  ppx lovenox  - A+, Rhogam not indicated  - admission hgb: 9.9  - PPBC: interval BTL, considering Nexplanon prior to DC    gHTN  - diagnosed by 2 mild range BPs > 4 hours apart  - asymptomatic  - BP largely normotensive  - no meds  - HELLP labs negative   - will continue to monitor, discussed 3 day stay for gHTN and pt states understanding  - BP cuff for home     Maternal Well-Being  - emotional support provided     Feeding  - breastfeeding/pumping encouraged; lactation consult prn    Dispo:  anticipate d/c on PPD #3 if BP well-controlled and meeting all postpartum milestones, for f/u 1 week for BP check and 1 month with Primary OB provider    DONI Bell    Principal Problem:    Encounter for elective induction of labor    Pregnancy Problems (from 24 to present)       Problem Noted Resolved    Encounter for elective induction of labor 2024 by Adriana Chandra MD No    Priority:  Medium      BMI 50.0-59.9, adult (CMS/[facility]) 2024 by EZEQUIEL Flores No    Priority:  Medium      Overview Signed 2024  2:29 PM by EZEQUIEL Flores     BMI = 51.59 at NOB  Fetal surveillance BMI >40 at NOB:  Growth US at 30 (50% at 33w) and 36 wks (67%)  BPP or NST weekly 34 wks to delivery    Timing of delivery: 39 0/7 - 39 6/7 wks  *BMI >= 50 at any time in pregnancy: Deliver at Level 4           History of shoulder dystocia in prior pregnancy 2024 by Nidia Gonzalez MD No    Priority:  Medium      Overview Signed 2024  4:16 PM by Nidia Gonzalez MD     Patient previously  reported hx of shoulder dystocia in prior delivery, however upon chart review. There was no shoulder dystocia. Per documentation in delivery summary, patient stopped pushing after delivery of head but with coaching eventually delivered remainder of  without difficulty. Baby was 9#6 and had broken clavicle, however, previous documentation states no shoulder and no manuvers performed         Obesity in pregnancy 2023 by EZEQUIEL Ledbetter No    Priority:  Medium      Overview Signed 2023  4:31 PM by EZEQUIEL Ledbetter     MD CARE ONLY          Elevated glucose level 10/26/2023 by Jeane Nieves No    Priority:  Medium      Overview Addendum 2024  2:26 PM by EZEQUIEL Flores     1hr 145-   3hr ordered ; not done at 38w         High risk multigravida in third trimester 10/26/2023 by Jeane Nieves No    Priority:  Medium      Overview Addendum 2024  2:28 PM by EZEQUIEL Flores     Dating: LMP c/s 7w US  Baby girl Norris  [x] Initial BMI: 50  [x] Prenatal Labs:   [x] Aneuploidy Screening:  CF/SMA neg   [x] Baby ASA:  [x] Anatomy US:  [x] 1hr GCT at 24-28wks: elevated   [] 3 hr - ordered, not done  [x] Tdap (27-36wks):   [x] Flu Shot: declined   [x] COVID vaccine: declined   [x] Rhogam (if Rh neg):  not inidicated  [x] GBS at 36 wks: positive   [x] Breastfeeding and bottle  [x] PPBC: Nexplanon, interval BTL  [x] 39 weeks discussion of IOL vs. Expectant management: desires IOL - RN tasked to schedule         History of pre-eclampsia in prior pregnancy, currently pregnant 10/26/2023 by Jeane Nieves No    Priority:  Medium      Overview Addendum 2024  2:27 PM by EZEQUIEL Flores     HELLP labs WNL   On ASA         Anemia during pregnancy in third trimester 10/26/2023 by Jeane Nieves No    Priority:  Medium      Overview Addendum 2024  2:12 PM by Marcelina  EZEQUIEL Dempsey     Lab Results   Component Value Date    WBC 12.8 (H) 12/29/2023    HGB 9.9 (L) 12/29/2023    HCT 32.6 (L) 12/29/2023    MCV 79 (L) 12/29/2023     12/29/2023            37 weeks gestation of pregnancy 12/19/2023 by Nidia Gonzalez MD 1/22/2024 by EZEQUIEL Flores    GERD (gastroesophageal reflux disease) 10/26/2023 by Jeane Nieves 1/22/2024 by EZEQUIEL Flores            Subjective   Her pain is well controlled with current medications  She is passing flatus  She is ambulating well  She is tolerating a Adult diet Regular  She reports no breast or nursing problems  She denies emotional concerns today      Denies HA, vision changes, RUQ pain, chest pain, or SOB.    Objective   Allergies:   Patient has no known allergies.         Last Vitals:  Temp Pulse Resp BP MAP Pulse Ox   36.4 °C (97.5 °F) 79 18 121/86   98 %     Vitals Min/Max Last 24 Hours:  Temp  Min: 35.9 °C (96.6 °F)  Max: 36.7 °C (98.1 °F)  Pulse  Min: 71  Max: 126  Resp  Min: 17  Max: 18  BP  Min: 99/57  Max: 152/79    Intake/Output:     Intake/Output Summary (Last 24 hours) at 1/28/2024 1401  Last data filed at 1/28/2024 0400  Gross per 24 hour   Intake --   Output 1672 ml   Net -1672 ml       Physical Exam:  General: well appearing, well-nourished, postpartum  Obstetric: abdomen soft/non-tender, fundus firm below umbilicus, lochia light  Skin: No rashes/lesions/erythema  Neuro: A/Ox3, conversational  GI: +flatus  Respiratory: Even and unlabored on RA  Extremities: No edema, discoloration, or pain in BLE, equal and palpable DP and PT pulses    Psych: appropriate mood and affect     Lab Data:  Lab Results   Component Value Date    WBC 11.9 (H) 01/26/2024    HGB 9.9 (L) 01/26/2024    HCT 31.9 (L) 01/26/2024     01/26/2024     Lab Results   Component Value Date    GLUCOSE 66 (L) 01/28/2024     01/28/2024    K 3.9 01/28/2024     01/28/2024    CO2 22 01/28/2024     ANIONGAP 12 01/28/2024    BUN 6 01/28/2024    CREATININE 0.60 01/28/2024    EGFR >90 01/28/2024    CALCIUM 8.6 01/28/2024    ALBUMIN 3.1 (L) 01/28/2024    PROT 6.2 (L) 01/28/2024    ALKPHOS 168 (H) 01/28/2024    ALT 16 01/28/2024    AST 19 01/28/2024    BILITOT 0.5 01/28/2024

## 2024-01-28 NOTE — LACTATION NOTE
Lactation Consultant Note  Lactation Consultation  Reason for Consult: Initial assessment, Other (Comment) (FORMULA / BOTTLE FEEDING as well as pumping)  Consultant Name: Mariela Ashraf RN, IBCLC    Maternal Information  Has mother  before?: No (she attempted but, the baby wouldn't latch)  Infant to breast within first 2 hours of birth?: Yes  Exclusive Pump and Bottle Feed: No (pumping d/t supplementing with formula)    Maternal Assessment  Breast Assessment: Large, Soft, Compressible  Nipple Assessment: Intact, Erect  [place] Assessment: Normal    Infant Assessment  Infant Behavior: Deep sleep    Feeding Assessment  Nutrition Source: Formula (per mother’s request)  Feeding Method: Paced bottle  Unable to assess infant feeding at this time: Other (Comment) (baby fed formula at 0630- no feeding cues seen)    LATCH TOOL       Breast Pump  Pump: Hospital grade electric pump, Massage, Single breast pumping (mom stated it is easier to pump one breast at a time d/t large breasts)  Frequency: Other (comment) (pumped once)  Breast Shield Size and Type: 21 mm, Other (comment) (decreased her to 21 mm d/t she measures to be 18 MM in diameter on both breasts)    Other OB Lactation Tools  Lactation Tools: Flanges, Lanolin    Patient Follow-up  Inpatient Lactation Follow-up Needed : Yes  Outpatient Lactation Follow-up: Recommended    Other OB Lactation Documentation  Maternal Risk Factors: Obesity (pre-pregnancy BMI >30)  Infant Risk Factors: Early term birth 37-39 weeks    Recommendations/Summary  I did not view a latch at this time. Mom stated she fed formula via bottle around 0630- baby appears to be content at this time.     Mom stated she attempted to breast feed her first child but, was not successful. This baby hasn't been able to latch (per mom) and she was worried therefore, she fed formula via bottle.   She was set up to pump and pumped 1 time- was able to obtain ML's to give to the baby via bottle  but, she has not pumped since.   Reviewed milk production and supply and the importance of skin to skin.     Offered to set her up to pump at this time and she was receptive.   Oriented mom to pump set up- use- and cleaning of pump parts.   Reviewed the difference between latching and pumping, the benefit of skin to skin, the benefits of breast massage prior to pumping, expectations of volume with pumping, milk storage and cleaning of pump parts.   Mom measures to be 18 MM diameter around both nipples, therefore, I decreased her to 21 MM flanges.   Mom prefers to pump one breast at a time d/t her large breasts; she states it is easier.   PI-123 given.     Encouraged her to call for assistance with latching the baby to the breast, if she wishes to latch. If she does not want to latch or she chooses to supplement with formula via bottle; encouraged her to pump every 3 hours (8-12 times in a 24 hour period) for 20 minutes on both breasts and to give the baby any pumped breast milk prior to any use of supplement.      Mom has a breast pump for at home.     Encouraged her to utilize the outpatient lactation resources, if needed.   Contact information given.   [phone] [place] or [phone] [place]      Report to bedside RN.

## 2024-01-29 PROBLEM — R73.09 ELEVATED GLUCOSE LEVEL: Status: RESOLVED | Noted: 2023-10-26 | Resolved: 2024-01-29

## 2024-01-29 PROBLEM — O09.43 HIGH RISK MULTIGRAVIDA IN THIRD TRIMESTER (HHS-HCC): Status: RESOLVED | Noted: 2023-10-26 | Resolved: 2024-01-29

## 2024-01-29 PROBLEM — O13.9 GESTATIONAL HYPERTENSION, ANTEPARTUM (HHS-HCC): Status: ACTIVE | Noted: 2024-01-29

## 2024-01-29 LAB
BLOOD EXPIRATION DATE: NORMAL
BLOOD EXPIRATION DATE: NORMAL
DISPENSE STATUS: NORMAL
DISPENSE STATUS: NORMAL
PRODUCT BLOOD TYPE: 6200
PRODUCT BLOOD TYPE: 6200
PRODUCT CODE: NORMAL
PRODUCT CODE: NORMAL
UNIT ABO: NORMAL
UNIT ABO: NORMAL
UNIT NUMBER: NORMAL
UNIT NUMBER: NORMAL
UNIT RH: NORMAL
UNIT RH: NORMAL
UNIT VOLUME: 316
UNIT VOLUME: 335
XM INTEP: NORMAL
XM INTEP: NORMAL

## 2024-01-29 PROCEDURE — 2500000004 HC RX 250 GENERAL PHARMACY W/ HCPCS (ALT 636 FOR OP/ED): Performed by: STUDENT IN AN ORGANIZED HEALTH CARE EDUCATION/TRAINING PROGRAM

## 2024-01-29 PROCEDURE — 1210000001 HC SEMI-PRIVATE ROOM DAILY

## 2024-01-29 PROCEDURE — 2500000001 HC RX 250 WO HCPCS SELF ADMINISTERED DRUGS (ALT 637 FOR MEDICARE OP): Performed by: STUDENT IN AN ORGANIZED HEALTH CARE EDUCATION/TRAINING PROGRAM

## 2024-01-29 RX ADMIN — ACETAMINOPHEN 975 MG: 325 TABLET ORAL at 04:15

## 2024-01-29 RX ADMIN — IBUPROFEN 600 MG: 600 TABLET ORAL at 04:16

## 2024-01-29 RX ADMIN — ENOXAPARIN SODIUM 80 MG: 80 INJECTION SUBCUTANEOUS at 22:37

## 2024-01-29 RX ADMIN — IBUPROFEN 600 MG: 600 TABLET ORAL at 16:20

## 2024-01-29 RX ADMIN — ACETAMINOPHEN 975 MG: 325 TABLET ORAL at 16:20

## 2024-01-29 RX ADMIN — ACETAMINOPHEN 975 MG: 325 TABLET ORAL at 10:25

## 2024-01-29 RX ADMIN — ACETAMINOPHEN 975 MG: 325 TABLET ORAL at 22:37

## 2024-01-29 RX ADMIN — IBUPROFEN 600 MG: 600 TABLET ORAL at 22:36

## 2024-01-29 RX ADMIN — IBUPROFEN 600 MG: 600 TABLET ORAL at 10:24

## 2024-01-29 ASSESSMENT — PAIN DESCRIPTION - DESCRIPTORS
DESCRIPTORS: CRAMPING
DESCRIPTORS: CRAMPING

## 2024-01-29 ASSESSMENT — PAIN SCALES - GENERAL
PAINLEVEL_OUTOF10: 4
PAINLEVEL_OUTOF10: 0 - NO PAIN
PAINLEVEL_OUTOF10: 0 - NO PAIN
PAINLEVEL_OUTOF10: 4
PAINLEVEL_OUTOF10: 5 - MODERATE PAIN

## 2024-01-29 ASSESSMENT — PAIN - FUNCTIONAL ASSESSMENT
PAIN_FUNCTIONAL_ASSESSMENT: 0-10
PAIN_FUNCTIONAL_ASSESSMENT: 0-10

## 2024-01-29 NOTE — LACTATION NOTE
Lactation Consultant Note  Lactation Consultation  Reason for Consult: Follow-up assessment, Other (Comment) (formula/ bottle feeding - not consistently latching or pumping)  Consultant Name: Darcie Ashraf RN, IBCLC    Maternal Information       Maternal Assessment       Infant Assessment       Feeding Assessment  Nutrition Source: Formula (per mother’s request)  Feeding Method: Paced bottle  Unable to assess infant feeding at this time: Other (Comment) (fed formula at this time)    LATCH TOOL       Breast Pump  Pump: Hospital grade electric pump, Single breast pumping  Frequency: Unable to recall  Duration: Initiate phase  Breast Shield Size and Type: 21 mm    Other OB Lactation Tools       Patient Follow-up  Inpatient Lactation Follow-up Needed : No  Outpatient Lactation Follow-up: Recommended  Lactation Professional - OK to Discharge: Yes    Other OB Lactation Documentation  Maternal Risk Factors: Obesity (pre-pregnancy BMI >30), Hypertension  Infant Risk Factors: Early term birth 37-39 weeks    Recommendations/Summary  I did not view a latch at this time.   Mom feeding formula via bottle at this time.   She stated she attempted to latch and pump yesterday but she was cramping too much and the pain with too painful with both. Therefore she has been feeding the baby formula via bottle nipple.     Mom stated she will latch or pump once her full milk supply comes in and  her cramping is less.   Reviewed milk production / supply and the importance of 8-12 stimulations to the breast in a 24 hour period. Also reviewed when her milk comes in how it is supply and demand; if she is not latching or pumping her supply will decrease.     Reviewed interventions to help decrease her cramping pain and encouraged mom to call for assistance with feeds if she would like to latch or pump.     Mom verbalized understanding.     Denies any questions at this time.   Mom unsure if she will be discharged home today.   She is  aware of outpatient lactation resources.     Report to bedside RN.

## 2024-01-29 NOTE — PROGRESS NOTES
Postpartum Progress Note    Assessment/Plan   Darcie Monroe is a 24 y.o., , who was admitted for term IOL, delivered at 39w1d gestation and is now postpartum day 2 s/p .     Routine postpartum care  - meeting all milestones  - no laceration,  mL  - bonding with female infant  - pain well controlled on po medications  - DVT Score: 5 - encourage ambulation,  SCDs, and  ppx lovenox  - A+, Rhogam not indicated  - admission hgb: 9.9  - PPBC: interval BTL, declines bridge    gHTN  - diagnosed by 2 mild range BPs > 4 hours apart  - asymptomatic  - BP largely normotensive to low mild range  - no meds  - HELLP labs negative   - will continue to monitor, discussed 3 day stay for gHTN and pt states understanding  - BP cuff for home     Maternal Well-Being  - emotional support provided     Feeding  - breastfeeding/pumping encouraged; lactation consult prn    Dispo:  anticipate d/c on PPD #3 if BP well-controlled and meeting all postpartum milestones, for f/u 1 week for BP check and 1 month with Primary OB provider    Principal Problem:    Encounter for elective induction of labor  Active Problems:    Gestational hypertension, antepartum    Pregnancy Problems (from 24 to present)       Problem Noted Resolved    Encounter for elective induction of labor 2024 by Adriana Chandra MD No    Priority:  Medium      BMI 50.0-59.9, adult (CMS/[facility]) 2024 by EZEQUIEL Flores No    Priority:  Medium      Overview Signed 2024  2:29 PM by EZEQUIEL Flores     BMI = 51.59 at NOB  Fetal surveillance BMI >40 at NOB:  Growth US at 30 (50% at 33w) and 36 wks (67%)  BPP or NST weekly 34 wks to delivery    Timing of delivery: 39 0/7 - 39 6/7 wks  *BMI >= 50 at any time in pregnancy: Deliver at Level 4           History of shoulder dystocia in prior pregnancy 2024 by Nidia Gonzalez MD No    Priority:  Medium      Overview Signed 2024  4:16 PM by Nidia Gonzalez MD      Patient previously reported hx of shoulder dystocia in prior delivery, however upon chart review. There was no shoulder dystocia. Per documentation in delivery summary, patient stopped pushing after delivery of head but with coaching eventually delivered remainder of  without difficulty. Baby was 9#6 and had broken clavicle, however, previous documentation states no shoulder and no manuvers performed         Obesity in pregnancy 2023 by EZEQUIEL Ledbetter No    Priority:  Medium      Overview Signed 2023  4:31 PM by EZEQUIEL Ledbetter     MD CARE ONLY          Elevated glucose level 10/26/2023 by Jeane Nieves No    Priority:  Medium      Overview Addendum 2024  2:26 PM by EZEQUIEL Flores     1hr 145-   3hr ordered ; not done at 38w         High risk multigravida in third trimester 10/26/2023 by Jeane Nieves No    Priority:  Medium      Overview Addendum 2024  2:28 PM by EZEQUIEL Flores     Dating: LMP c/s 7w US  Baby girl Michael  [x] Initial BMI: 50  [x] Prenatal Labs:   [x] Aneuploidy Screening:  CF/SMA neg   [x] Baby ASA:  [x] Anatomy US:  [x] 1hr GCT at 24-28wks: elevated   [] 3 hr - ordered, not done  [x] Tdap (27-36wks):   [x] Flu Shot: declined   [x] COVID vaccine: declined   [x] Rhogam (if Rh neg):  not inidicated  [x] GBS at 36 wks: positive   [x] Breastfeeding and bottle  [x] PPBC: Nexplanon, interval BTL  [x] 39 weeks discussion of IOL vs. Expectant management: desires IOL - RN tasked to schedule         History of pre-eclampsia in prior pregnancy, currently pregnant 10/26/2023 by Jeane Nieves No    Priority:  Medium      Overview Addendum 2024  2:27 PM by EZEQUIEL Flores     HELLP labs WNL   On ASA         Anemia during pregnancy in third trimester 10/26/2023 by Jeane Nieves No    Priority:  Medium      Overview Addendum 2024  2:12  PM by EZEQUIEL Flores     Lab Results   Component Value Date    WBC 12.8 (H) 12/29/2023    HGB 9.9 (L) 12/29/2023    HCT 32.6 (L) 12/29/2023    MCV 79 (L) 12/29/2023     12/29/2023            37 weeks gestation of pregnancy 12/19/2023 by Nidia Gonzalez MD 1/22/2024 by EZEQUIEL Flores    GERD (gastroesophageal reflux disease) 10/26/2023 by Jeane Nieves 1/22/2024 by EZEQUIEL Flores            Subjective   Her pain is well controlled with current medications  She is passing flatus  She is ambulating well  She is tolerating a Adult diet Regular  She reports no breast or nursing problems  She denies emotional concerns today      Denies HA, vision changes, RUQ pain, chest pain, or SOB.    Objective   Allergies:   Patient has no known allergies.         Last Vitals:  Temp Pulse Resp BP MAP Pulse Ox   36.3 °C (97.3 °F) 97 16 (!) 133/93   97 %     Vitals Min/Max Last 24 Hours:  Temp  Min: 36.3 °C (97.3 °F)  Max: 36.8 °C (98.2 °F)  Pulse  Min: 68  Max: 97  Resp  Min: 16  Max: 20  BP  Min: 111/77  Max: 138/84    Intake/Output:   No intake or output data in the 24 hours ending 01/29/24 1252      Physical Exam:  General: well appearing, well-nourished, postpartum  Obstetric: abdomen soft/non-tender, fundus firm below umbilicus, lochia light  Skin: No rashes/lesions/erythema  Neuro: A/Ox3, conversational  GI: +flatus  Respiratory: Even and unlabored on RA  Extremities: No edema, discoloration, or pain in BLE, equal and palpable DP and PT pulses    Psych: appropriate mood and affect     Lab Data:  Lab Results   Component Value Date    WBC 11.9 (H) 01/26/2024    HGB 9.9 (L) 01/26/2024    HCT 31.9 (L) 01/26/2024     01/26/2024     Lab Results   Component Value Date    GLUCOSE 66 (L) 01/28/2024     01/28/2024    K 3.9 01/28/2024     01/28/2024    CO2 22 01/28/2024    ANIONGAP 12 01/28/2024    BUN 6 01/28/2024    CREATININE 0.60 01/28/2024    EGFR  >90 01/28/2024    CALCIUM 8.6 01/28/2024    ALBUMIN 3.1 (L) 01/28/2024    PROT 6.2 (L) 01/28/2024    ALKPHOS 168 (H) 01/28/2024    ALT 16 01/28/2024    AST 19 01/28/2024    BILITOT 0.5 01/28/2024

## 2024-01-30 VITALS
HEIGHT: 66 IN | TEMPERATURE: 98.2 F | SYSTOLIC BLOOD PRESSURE: 126 MMHG | RESPIRATION RATE: 16 BRPM | OXYGEN SATURATION: 98 % | WEIGHT: 293 LBS | BODY MASS INDEX: 47.09 KG/M2 | HEART RATE: 75 BPM | DIASTOLIC BLOOD PRESSURE: 86 MMHG

## 2024-01-30 PROBLEM — O99.210 OBESITY IN PREGNANCY (HHS-HCC): Status: RESOLVED | Noted: 2023-12-05 | Resolved: 2024-01-30

## 2024-01-30 PROBLEM — R03.0 ELEVATED BLOOD-PRESSURE READING WITHOUT DIAGNOSIS OF HYPERTENSION: Status: RESOLVED | Noted: 2021-09-21 | Resolved: 2024-01-30

## 2024-01-30 PROCEDURE — 2500000001 HC RX 250 WO HCPCS SELF ADMINISTERED DRUGS (ALT 637 FOR MEDICARE OP): Performed by: STUDENT IN AN ORGANIZED HEALTH CARE EDUCATION/TRAINING PROGRAM

## 2024-01-30 RX ORDER — ACETAMINOPHEN 500 MG
1000 TABLET ORAL EVERY 6 HOURS PRN
Qty: 120 TABLET | Refills: 0 | Status: SHIPPED | OUTPATIENT
Start: 2024-01-30 | End: 2024-01-30 | Stop reason: SDUPTHER

## 2024-01-30 RX ORDER — ACETAMINOPHEN 500 MG
1000 TABLET ORAL EVERY 6 HOURS PRN
Qty: 120 TABLET | Refills: 0 | Status: SHIPPED | OUTPATIENT
Start: 2024-01-30 | End: 2024-04-24 | Stop reason: WASHOUT

## 2024-01-30 RX ORDER — IBUPROFEN 600 MG/1
600 TABLET ORAL EVERY 6 HOURS PRN
Qty: 120 TABLET | Refills: 0 | Status: SHIPPED | OUTPATIENT
Start: 2024-01-30 | End: 2024-01-30 | Stop reason: SDUPTHER

## 2024-01-30 RX ORDER — IBUPROFEN 600 MG/1
600 TABLET ORAL EVERY 6 HOURS PRN
Qty: 120 TABLET | Refills: 0 | Status: SHIPPED | OUTPATIENT
Start: 2024-01-30 | End: 2024-04-24 | Stop reason: WASHOUT

## 2024-01-30 RX ADMIN — ACETAMINOPHEN 975 MG: 325 TABLET ORAL at 04:30

## 2024-01-30 RX ADMIN — IBUPROFEN 600 MG: 600 TABLET ORAL at 04:30

## 2024-01-30 RX ADMIN — ACETAMINOPHEN 975 MG: 325 TABLET ORAL at 10:36

## 2024-01-30 RX ADMIN — IBUPROFEN 600 MG: 600 TABLET ORAL at 10:36

## 2024-01-30 ASSESSMENT — PAIN SCALES - GENERAL
PAINLEVEL_OUTOF10: 10 - WORST POSSIBLE PAIN
PAINLEVEL_OUTOF10: 10 - WORST POSSIBLE PAIN
PAINLEVEL_OUTOF10: 3
PAINLEVEL_OUTOF10: 10 - WORST POSSIBLE PAIN
PAINLEVEL_OUTOF10: 10 - WORST POSSIBLE PAIN

## 2024-01-30 ASSESSMENT — PAIN - FUNCTIONAL ASSESSMENT
PAIN_FUNCTIONAL_ASSESSMENT: 0-10
PAIN_FUNCTIONAL_ASSESSMENT: 0-10

## 2024-01-30 ASSESSMENT — PAIN DESCRIPTION - DESCRIPTORS: DESCRIPTORS: CRAMPING

## 2024-01-30 NOTE — CARE PLAN
The patient's goals for the shift include Maintain adequate pain control    The clinical goals for the shift include VSS and adequate pain control.

## 2024-01-30 NOTE — LACTATION NOTE
Lactation Consultant Note  Lactation Consultation  Reason for Consult: Follow-up assessment, Other (Comment) (formula feeding / not pumping)  Consultant Name: Darcie Wilburn, RN, IBCLC    Maternal Information       Maternal Assessment       Infant Assessment       Feeding Assessment  Unable to assess infant feeding at this time: Maternal request    LATCH TOOL       Breast Pump       Other OB Lactation Tools       Patient Follow-up  Inpatient Lactation Follow-up Needed : No  Lactation Professional - OK to Discharge: Yes    Other OB Lactation Documentation       Recommendations/Summary  Patient verbalized she has decided to not breast feed d/t the pain with cramping when she latches or pumps.     Advised her no stimulation to the breasts, tight bra, use of cold compresses and use of Motrin for discomfort in aiding her body to decrease/ stop producing milk.     Encouraged her to reach out to outpatient lactation if needed after discharge.     Mom verbalized understanding and denied any questions at this time

## 2024-01-30 NOTE — DISCHARGE SUMMARY
Discharge Summary    Admission Date: 2024  Discharge Date: 24  Discharge Diagnosis:  (normal spontaneous vaginal delivery)     Patient Active Problem List   Diagnosis    History of pre-eclampsia in prior pregnancy, currently pregnant    Anemia during pregnancy in third trimester    ADHD (attention deficit hyperactivity disorder)    Post traumatic stress disorder (PTSD)    History of shoulder dystocia in prior pregnancy    BMI 50.0-59.9, adult (CMS/[facility])    Hx of Bell's palsy    Heart disease, unspecified     (normal spontaneous vaginal delivery)    Gestational hypertension, antepartum       Hospital Course  Darcie Monroe is a 24 y.o.,     Initially presented for: iol    Admission Date: 2024    Delivery Date: 2024  9:03 PM     Delivery type: Vaginal, Spontaneous      GA at delivery: 39w1d    Outcome: Living     Anesthesia during delivery: Epidural     Intrapartum complications: None     Feeding method: Breastfeeding Status: No (Patient is not breastfeeding at the moment due to cramping pain.)    Contraception: Interval tubal ligation and Declines bridge method    Rhogam: The patient's blood type is A POS.  Rhogam is not indicated.     Now postpartum day: 3.    Hospital course n/f:  Pt requesting new BP cuff bc home BP monitor always reads high. Care coordinator Tg Roach RN explained she will be billed for an additional BP cuff. Pt given number for [facility] to call for calibration of home cuff.    gHTN  - diagnosed by 2 mild range BPs > 4 hours apart  - remains asymptomatic  - BP largely normotensive   - no meds  - HELLP labs negative     PP course otherwise uneventful.  Meeting all postpartum milestones- ambulating independently, passing flatus, tolerating PO intake, lochia light, voiding spontaneously, and pain well controlled with PO meds.     Dispo  OK for DC today, [place]  6 week postpartum follow-up with prenatal provider.     Pertinent Physical  Exam At Time of Discharge  General: well appearing, obese, postpartum  Obstetric: fundus firm below umbilicus, lochia light  Skin: Warm, dry; no rashes/lesions/erythema  Breast: No masses, nipple discharge  Neuro: A/Ox3, conversational, no gross motor deficit   GI: no distension, appropriately tender, soft, +BS  Respiratory: Even and unlabored on RA, LSCTA BL  Cardiovascular: Trace BLE edema; No erythema, warmth  Psych: appropriate mood and affect       Your medication list        START taking these medications        Instructions Last Dose Given Next Dose Due   acetaminophen 500 mg tablet  Commonly known as: Tylenol      Take 2 tablets (1,000 mg) by mouth every 6 hours if needed for moderate pain (4 - 6).       ibuprofen 600 mg tablet      Take 1 tablet (600 mg) by mouth every 6 hours if needed for moderate pain (4 - 6) (pain).              CONTINUE taking these medications        Instructions Last Dose Given Next Dose Due   blood pressure test kit-large kit      1 Units once daily.       famotidine 20 mg tablet  Commonly known as: Pepcid      Take 1 tablet (20 mg) by mouth 2 times a day.       ferrous sulfate (325 mg ferrous sulfate) tablet      Take 1 tablet by mouth 2 times a day.       polyethylene glycol 17 gram/dose powder  Commonly known as: Glycolax, Miralax      MIX 1 CAPFUL (17GM) IN 8 OUNCES OF WATER, JUICE, OR TEA AND DRINK DAILY.       Prenatal 27 mg iron-800 mcg folic acid tablet  Generic drug: prenatal vitamin (iron-folic)      TAKE 1 TABLET DAILY.                 Where to Get Your Medications        These medications were sent to [facility]  [address] [place] [address]      Hours: 8:30 AM to 5 PM Mon-Fri Phone: [phone]   acetaminophen 500 mg tablet  ibuprofen 600 mg tablet           Outpatient Follow-Up  No future appointments.    Savannah Smiley, APRN-CNP

## 2024-01-31 ENCOUNTER — CLINICAL SUPPORT (OUTPATIENT)
Dept: OBSTETRICS AND GYNECOLOGY | Facility: CLINIC | Age: 24
End: 2024-01-31
Payer: COMMERCIAL

## 2024-01-31 VITALS
BODY MASS INDEX: 52.78 KG/M2 | HEART RATE: 87 BPM | WEIGHT: 293 LBS | SYSTOLIC BLOOD PRESSURE: 121 MMHG | DIASTOLIC BLOOD PRESSURE: 82 MMHG

## 2024-01-31 NOTE — PROGRESS NOTES
PP blood pressure check. DOD  24  IOL .  gHTN dx by 2 mild bps  HELLP neg  Pt here following  visit  Reports she is not doing bps at home- feels her cuff is not working right and reading high. Did not bring it in today and advised may bring to [facility] for check.  Reviewed s.s of increase bp and given BP log  Advised if symptomatic and unable to check BP to call or be seen  Today bp  121/82  Overall feels good, occ cramping and we discussed comfort measures and pt to  her rx for ibuprofen  To schedule 4-6 pp check and pt reports she had signed TL papers but did not get TL. Informed this may be discussed at pp visit and declined birth control in the hospital. Discussed with pt condom use if not on contraception and pt has intercourse prior to pp visit.

## 2024-02-12 ENCOUNTER — HOSPITAL ENCOUNTER (OUTPATIENT)
Facility: HOSPITAL | Age: 24
Discharge: LEFT BEFORE DISCHARGE | End: 2024-02-13
Attending: OBSTETRICS & GYNECOLOGY | Admitting: OBSTETRICS & GYNECOLOGY
Payer: COMMERCIAL

## 2024-02-12 ENCOUNTER — CLINICAL SUPPORT (OUTPATIENT)
Dept: EMERGENCY MEDICINE | Facility: HOSPITAL | Age: 24
End: 2024-02-12
Payer: COMMERCIAL

## 2024-02-12 ENCOUNTER — HOSPITAL ENCOUNTER (EMERGENCY)
Facility: HOSPITAL | Age: 24
Discharge: HOME | End: 2024-02-13
Payer: COMMERCIAL

## 2024-02-12 VITALS
WEIGHT: 293 LBS | DIASTOLIC BLOOD PRESSURE: 90 MMHG | HEART RATE: 87 BPM | RESPIRATION RATE: 20 BRPM | BODY MASS INDEX: 47.09 KG/M2 | HEIGHT: 66 IN | TEMPERATURE: 96.7 F | SYSTOLIC BLOOD PRESSURE: 134 MMHG | OXYGEN SATURATION: 100 %

## 2024-02-12 VITALS
OXYGEN SATURATION: 100 % | TEMPERATURE: 97.3 F | HEART RATE: 93 BPM | RESPIRATION RATE: 22 BRPM | SYSTOLIC BLOOD PRESSURE: 148 MMHG | DIASTOLIC BLOOD PRESSURE: 88 MMHG

## 2024-02-12 LAB
ATRIAL RATE: 76 BPM
P AXIS: 56 DEGREES
P OFFSET: 186 MS
P ONSET: 138 MS
PR INTERVAL: 170 MS
Q ONSET: 223 MS
QRS COUNT: 12 BEATS
QRS DURATION: 74 MS
QT INTERVAL: 380 MS
QTC CALCULATION(BAZETT): 427 MS
QTC FREDERICIA: 411 MS
R AXIS: 49 DEGREES
T AXIS: 47 DEGREES
T OFFSET: 413 MS
VENTRICULAR RATE: 76 BPM

## 2024-02-12 PROCEDURE — 93005 ELECTROCARDIOGRAM TRACING: CPT

## 2024-02-12 PROCEDURE — G0378 HOSPITAL OBSERVATION PER HR: HCPCS

## 2024-02-12 PROCEDURE — 99283 EMERGENCY DEPT VISIT LOW MDM: CPT | Mod: 25

## 2024-02-12 PROCEDURE — 99281 EMR DPT VST MAYX REQ PHY/QHP: CPT | Mod: 25

## 2024-02-12 PROCEDURE — 1850000001 HC LEAVE OF ABSENCE - HOSPITAL SERVICES

## 2024-02-12 ASSESSMENT — COLUMBIA-SUICIDE SEVERITY RATING SCALE - C-SSRS
2. HAVE YOU ACTUALLY HAD ANY THOUGHTS OF KILLING YOURSELF?: NO
1. IN THE PAST MONTH, HAVE YOU WISHED YOU WERE DEAD OR WISHED YOU COULD GO TO SLEEP AND NOT WAKE UP?: NO
6. HAVE YOU EVER DONE ANYTHING, STARTED TO DO ANYTHING, OR PREPARED TO DO ANYTHING TO END YOUR LIFE?: NO

## 2024-02-12 ASSESSMENT — LIFESTYLE VARIABLES
EVER FELT BAD OR GUILTY ABOUT YOUR DRINKING: NO
HAVE PEOPLE ANNOYED YOU BY CRITICIZING YOUR DRINKING: NO
HAVE YOU EVER FELT YOU SHOULD CUT DOWN ON YOUR DRINKING: NO
EVER HAD A DRINK FIRST THING IN THE MORNING TO STEADY YOUR NERVES TO GET RID OF A HANGOVER: NO

## 2024-02-12 ASSESSMENT — PAIN SCALES - GENERAL: PAINLEVEL_OUTOF10: 10 - WORST POSSIBLE PAIN

## 2024-02-12 ASSESSMENT — PAIN DESCRIPTION - DESCRIPTORS: DESCRIPTORS: ACHING;DISCOMFORT

## 2024-02-12 ASSESSMENT — PAIN - FUNCTIONAL ASSESSMENT: PAIN_FUNCTIONAL_ASSESSMENT: 0-10

## 2024-02-12 ASSESSMENT — PAIN DESCRIPTION - LOCATION: LOCATION: CHEST

## 2024-02-13 ENCOUNTER — HOSPITAL ENCOUNTER (EMERGENCY)
Facility: HOSPITAL | Age: 24
End: 2024-02-13
Payer: COMMERCIAL

## 2024-02-13 PROCEDURE — 1850000001 HC LEAVE OF ABSENCE - HOSPITAL SERVICES

## 2024-02-13 PROCEDURE — 99213 OFFICE O/P EST LOW 20 MIN: CPT | Mod: 25

## 2024-02-13 PROCEDURE — G0378 HOSPITAL OBSERVATION PER HR: HCPCS

## 2024-02-13 NOTE — ED TRIAGE NOTES
Patient reports shortness of breath with chest pain and back pain that began around 1900. Patient recently had a baby 14 days ago.

## 2024-03-07 ENCOUNTER — APPOINTMENT (OUTPATIENT)
Dept: OBSTETRICS AND GYNECOLOGY | Facility: CLINIC | Age: 24
End: 2024-03-07
Payer: COMMERCIAL

## 2024-03-14 ENCOUNTER — APPOINTMENT (OUTPATIENT)
Dept: GASTROENTEROLOGY | Facility: HOSPITAL | Age: 24
End: 2024-03-14
Payer: COMMERCIAL

## 2024-04-04 ENCOUNTER — POSTPARTUM VISIT (OUTPATIENT)
Dept: OBSTETRICS AND GYNECOLOGY | Facility: CLINIC | Age: 24
End: 2024-04-04
Payer: COMMERCIAL

## 2024-04-04 VITALS
WEIGHT: 293 LBS | DIASTOLIC BLOOD PRESSURE: 84 MMHG | BODY MASS INDEX: 53.72 KG/M2 | HEART RATE: 84 BPM | SYSTOLIC BLOOD PRESSURE: 129 MMHG

## 2024-04-04 DIAGNOSIS — Z82.49 FAMILY HISTORY OF ISCHEMIC HEART DISEASE BEFORE AGE 50: ICD-10-CM

## 2024-04-04 DIAGNOSIS — M25.471 BILATERAL SWELLING OF FEET AND ANKLES: ICD-10-CM

## 2024-04-04 DIAGNOSIS — M25.474 BILATERAL SWELLING OF FEET AND ANKLES: ICD-10-CM

## 2024-04-04 DIAGNOSIS — M25.475 BILATERAL SWELLING OF FEET AND ANKLES: ICD-10-CM

## 2024-04-04 DIAGNOSIS — M25.472 BILATERAL SWELLING OF FEET AND ANKLES: ICD-10-CM

## 2024-04-04 DIAGNOSIS — Z90.49 S/P LAPAROSCOPIC CHOLECYSTECTOMY: ICD-10-CM

## 2024-04-04 DIAGNOSIS — Z01.419 VISIT FOR GYNECOLOGIC EXAMINATION: ICD-10-CM

## 2024-04-04 PROCEDURE — 87661 TRICHOMONAS VAGINALIS AMPLIF: CPT | Mod: 59 | Performed by: ADVANCED PRACTICE MIDWIFE

## 2024-04-04 PROCEDURE — 99214 OFFICE O/P EST MOD 30 MIN: CPT | Mod: 25 | Performed by: ADVANCED PRACTICE MIDWIFE

## 2024-04-04 PROCEDURE — 87624 HPV HI-RISK TYP POOLED RSLT: CPT | Performed by: ADVANCED PRACTICE MIDWIFE

## 2024-04-04 PROCEDURE — 87800 DETECT AGNT MULT DNA DIREC: CPT | Performed by: ADVANCED PRACTICE MIDWIFE

## 2024-04-04 PROCEDURE — 88175 CYTOPATH C/V AUTO FLUID REDO: CPT | Mod: TC,GCY | Performed by: ADVANCED PRACTICE MIDWIFE

## 2024-04-04 ASSESSMENT — ENCOUNTER SYMPTOMS
PSYCHIATRIC NEGATIVE: 0
ENDOCRINE NEGATIVE: 0
CONSTITUTIONAL NEGATIVE: 0
HEMATOLOGIC/LYMPHATIC NEGATIVE: 0
GASTROINTESTINAL NEGATIVE: 0
EYES NEGATIVE: 0
CARDIOVASCULAR NEGATIVE: 0
NEUROLOGICAL NEGATIVE: 0
ALLERGIC/IMMUNOLOGIC NEGATIVE: 0
RESPIRATORY NEGATIVE: 0
MUSCULOSKELETAL NEGATIVE: 0

## 2024-04-04 ASSESSMENT — EDINBURGH POSTNATAL DEPRESSION SCALE (EPDS)
I HAVE BEEN ANXIOUS OR WORRIED FOR NO GOOD REASON: YES, SOMETIMES
THE THOUGHT OF HARMING MYSELF HAS OCCURRED TO ME: NEVER
THINGS HAVE BEEN GETTING ON TOP OF ME: NO, MOST OF THE TIME I HAVE COPED QUITE WELL
I HAVE LOOKED FORWARD WITH ENJOYMENT TO THINGS: AS MUCH AS I EVER DID
I HAVE BEEN SO UNHAPPY THAT I HAVE BEEN CRYING: ONLY OCCASIONALLY
I HAVE BLAMED MYSELF UNNECESSARILY WHEN THINGS WENT WRONG: NO, NEVER
TOTAL SCORE: 7
I HAVE BEEN ABLE TO LAUGH AND SEE THE FUNNY SIDE OF THINGS: AS MUCH AS I ALWAYS COULD
I HAVE FELT SCARED OR PANICKY FOR NO GOOD REASON: NO, NOT MUCH
I HAVE FELT SAD OR MISERABLE: NOT VERY OFTEN
I HAVE BEEN SO UNHAPPY THAT I HAVE HAD DIFFICULTY SLEEPING: NOT VERY OFTEN

## 2024-04-04 ASSESSMENT — PAIN SCALES - GENERAL: PAINLEVEL: 0-NO PAIN

## 2024-04-04 NOTE — PROGRESS NOTES
Plan    Advised to call office for breast complaints, abnormal bleeding, mood changes, or other concerning symptoms.   Cleared to resume normal activity as desired  Referral to cardiology and primary care  Request for compression stockings  Cardiac warning signs reviewed with patient    Return as needed  Laquita ISSA CNM      Problem List Items Addressed This Visit    None  Visit Diagnoses         Codes    Encounter for postpartum visit    -  Primary Z39.2    Family history of ischemic heart disease before age 50     Z82.49    Relevant Orders    Referral to Cardiology    Bilateral swelling of feet and ankles     M25.471, M25.474, M25.475, M25.472    Relevant Orders    Referral to Cardiology    S/P laparoscopic cholecystectomy     Z90.49            EZEQUIEL Ledbetter    Subjective   24 y.o.  presenting for postpartum follow-up     Had recent gallbladder removal 4wk after delivery done at [facility] - no residual chest pain- but does report swelling in feet    Delivery Date: /  Gestational Age: <None>   Type of Delivery: Vaginal, Spontaneous      Pregnancy Problems (from 24 to present)       Problem Noted Resolved    History of pre-eclampsia in prior pregnancy, currently pregnant 10/26/2023 by Jeane Nieves No    Priority:  Medium      Overview Addendum 2024  2:27 PM by EZEQUIEL Flores     HELLP labs WNL   On ASA         Anemia during pregnancy in third trimester 10/26/2023 by Jeane Nieves No    Priority:  Medium      Overview Addendum 2024  2:12 PM by EZEQUIEL Flores     Lab Results   Component Value Date    WBC 12.8 (H) 2023    HGB 9.9 (L) 2023    HCT 32.6 (L) 2023    MCV 79 (L) 2023     2023            Gestational hypertension, antepartum 2024 by FELICIA Shepherd No     (normal spontaneous vaginal delivery) 2024 by Adriana Chandra MD No    BMI  50.0-59.9, adult (CMS/HCC) 2024 by EZEQUIEL Flores No    Overview Signed 2024  2:29 PM by EZEQUIEL Flores     BMI = 51.59 at NOB  Fetal surveillance BMI >40 at NOB:  Growth US at 30 (50% at 33w) and 36 wks (67%)  BPP or NST weekly 34 wks to delivery    Timing of delivery: 39 0/7 - 39 6/7 wks  *BMI >= 50 at any time in pregnancy: Deliver at Level 4           History of shoulder dystocia in prior pregnancy 2024 by Nidia Gonzalez MD No    Overview Signed 2024  4:16 PM by Nidia Gonzalez MD     Patient previously reported hx of shoulder dystocia in prior delivery, however upon chart review. There was no shoulder dystocia. Per documentation in delivery summary, patient stopped pushing after delivery of head but with coaching eventually delivered remainder of  without difficulty. Baby was 9#6 and had broken clavicle, however, previous documentation states no shoulder and no manuvers performed         Obesity in pregnancy 2023 by EZEQUIEL Ledbetter 2024 by FELICIA Shepherd    Priority:  Medium      Overview Signed 2023  4:31 PM by EZEQUIEL Ledbetter MD CARE ONLY          Elevated glucose level 10/26/2023 by Jeane Nieves 2024 by FELICIA Shepherd    Priority:  Medium      Overview Addendum 2024  2:26 PM by EZEQUIEL Flores     1hr 145-   3hr ordered ; not done at 38w         GERD (gastroesophageal reflux disease) 10/26/2023 by Jeane Nieves 2024 by EZEQUIEL Flores    Priority:  Medium      High risk multigravida in third trimester 10/26/2023 by Jeane Nieves 2024 by FELICIA Shepherd    Priority:  Medium      Overview Addendum 2024  2:28 PM by EZEQUIEL Flores     Dating: LMP c/s 7w US  Baby girl [place]  [x] Initial BMI: 50  [x] Prenatal Labs:   [x] Aneuploidy Screening:  CF/SMA neg   [x] Baby ASA:  [x]  Anatomy US:  [x] 1hr GCT at 24-28wks: elevated   [] 3 hr - ordered, not done  [x] Tdap (27-36wks):   [x] Flu Shot: declined   [x] COVID vaccine: declined   [x] Rhogam (if Rh neg):  not inidicated  [x] GBS at 36 wks: positive   [x] Breastfeeding and bottle  [x] PPBC: Nexplanon, interval BTL  [x] 39 weeks discussion of IOL vs. Expectant management: desires IOL - RN tasked to schedule         37 weeks gestation of pregnancy 2023 by Nidia Gonzalez MD 2024 by EZEQUIEL Flores            Concerns: swelling bilaterally ankle    Pain: controlled  Lacerations: none  Lochia: none  Sexual Intimacy: No  Contraceptive Method: None  Feeding Method: She is bottle feeding. no breast or nursing problems  Lactation Consult Needed?: No    Birth Trauma: No  Bonding with Baby: well with baby girl, @Ramona@   Mood: EPDS 7  Postpartum Depression: Medium Risk (10/30/2023)    [place]  Depression Scale     Last EPDS Total Score: 8     Last EPDS Self Harm Result: Never       Last pap: none collected today   Objective    /84   Pulse 84   Wt (!) 151 kg (332 lb 12.8 oz)   LMP 2024   Breastfeeding No   BMI 53.72 kg/m²    Physical Exam  Constitutional:       Appearance: Normal appearance. She is obese.   Genitourinary:      Vulva normal.      No lesions in the vagina.      Right Labia: No rash, tenderness, lesions, skin changes or Bartholin's cyst.     Left Labia: No tenderness, lesions, skin changes, Bartholin's cyst or rash.     No vaginal discharge, erythema, tenderness, bleeding or ulceration.      Cervix is parous.      Cervical motion tenderness present.      No cervical discharge or friability.   Cardiovascular:      Rate and Rhythm: Normal rate and regular rhythm.      Pulses: Normal pulses.      Heart sounds: Normal heart sounds. No murmur heard.     No friction rub. No gallop.   Pulmonary:      Effort: Pulmonary effort is normal.      Breath sounds: Normal breath  sounds.   Abdominal:      General: Abdomen is flat. Bowel sounds are normal.      Palpations: Abdomen is soft.   Musculoskeletal:         General: Swelling and tenderness present.      Right lower leg: Edema present.      Left lower leg: Edema present.      Comments: Tenderness over right foot and ankle  No calf tenderness bilaterally    Skin:     General: Skin is warm and dry.   Psychiatric:         Mood and Affect: Mood normal.         Behavior: Behavior normal.         Thought Content: Thought content normal.         Judgment: Judgment normal.

## 2024-04-06 LAB
C TRACH RRNA SPEC QL NAA+PROBE: NEGATIVE
N GONORRHOEA DNA SPEC QL PROBE+SIG AMP: NEGATIVE
T VAGINALIS RRNA SPEC QL NAA+PROBE: NEGATIVE

## 2024-04-24 ENCOUNTER — PREP FOR PROCEDURE (OUTPATIENT)
Dept: OBSTETRICS AND GYNECOLOGY | Facility: CLINIC | Age: 24
End: 2024-04-24

## 2024-04-24 ENCOUNTER — OFFICE VISIT (OUTPATIENT)
Dept: OBSTETRICS AND GYNECOLOGY | Facility: CLINIC | Age: 24
End: 2024-04-24
Payer: COMMERCIAL

## 2024-04-24 VITALS
WEIGHT: 293 LBS | DIASTOLIC BLOOD PRESSURE: 88 MMHG | SYSTOLIC BLOOD PRESSURE: 138 MMHG | HEART RATE: 76 BPM | BODY MASS INDEX: 47.09 KG/M2 | HEIGHT: 66 IN

## 2024-04-24 DIAGNOSIS — Z30.2 ENCOUNTER FOR STERILIZATION: Primary | ICD-10-CM

## 2024-04-24 DIAGNOSIS — Z30.2 REQUEST FOR STERILIZATION: Primary | ICD-10-CM

## 2024-04-24 ASSESSMENT — PAIN SCALES - GENERAL: PAINLEVEL: 0-NO PAIN

## 2024-04-24 NOTE — ASSESSMENT & PLAN NOTE
- Risk and benefits of permanent sterilization reviewed, including regret. Patient wishes to proceed   - Federal consent signed on 4/4/2024  - Current contraception: abstinence, encouraged to abstain from unprotected intercourse for the two weeks leading up to procedure   - Surgical consent signed on 4/24/2024

## 2024-04-24 NOTE — PROGRESS NOTES
Subjective   Patient ID: Darcie Monroe is a 24 y.o. female who presents for consult visit for tubal sterilization.   S/p second  in 2024. Reports desire for permanent sterilization for the last few years and is still certain that she would like to proceed with procedure.     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      Objective   Physical Exam  Constitutional:       Appearance: Normal appearance.   Eyes:      Extraocular Movements: Extraocular movements intact.   Musculoskeletal:         General: Normal range of motion.      Cervical back: Normal range of motion.   Skin:     General: Skin is warm.   Neurological:      General: No focal deficit present.   Psychiatric:         Mood and Affect: Mood normal.       Assessment/Plan   Problem List Items Addressed This Visit             ICD-10-CM    Encounter for sterilization - Primary Z30.2     - Risk and benefits of permanent sterilization reviewed, including regret. Patient wishes to proceed   - Federal consent signed on 2024  - Current contraception: abstinence, encouraged to abstain from unprotected intercourse for the two weeks leading up to procedure   - Surgical consent signed on 2024            Seen and discussed with Dr. Raman Mancini MD, PGY-2

## 2024-04-26 LAB
CYTOLOGY CMNT CVX/VAG CYTO-IMP: NORMAL
HPV HR 12 DNA GENITAL QL NAA+PROBE: NEGATIVE
HPV HR GENOTYPES PNL CVX NAA+PROBE: NEGATIVE
HPV16 DNA SPEC QL NAA+PROBE: NEGATIVE
HPV18 DNA SPEC QL NAA+PROBE: NEGATIVE
LAB AP HPV GENOTYPE QUESTION: YES
LAB AP HPV HR: NORMAL
LAB AP PAP ADDITIONAL TESTS: NORMAL
LABORATORY COMMENT REPORT: NORMAL
LMP START DATE: NORMAL
PATH REPORT.TOTAL CANCER: NORMAL

## 2024-04-29 ENCOUNTER — HOSPITAL ENCOUNTER (OUTPATIENT)
Facility: HOSPITAL | Age: 24
Setting detail: OUTPATIENT SURGERY
End: 2024-04-29
Attending: OBSTETRICS & GYNECOLOGY | Admitting: OBSTETRICS & GYNECOLOGY
Payer: COMMERCIAL

## 2024-04-29 PROBLEM — Z30.2 REQUEST FOR STERILIZATION: Status: ACTIVE | Noted: 2024-04-24

## 2024-04-30 ENCOUNTER — LAB (OUTPATIENT)
Dept: LAB | Facility: LAB | Age: 24
End: 2024-04-30
Payer: COMMERCIAL

## 2024-04-30 DIAGNOSIS — O09.299 HISTORY OF PRE-ECLAMPSIA IN PRIOR PREGNANCY, CURRENTLY PREGNANT (HHS-HCC): ICD-10-CM

## 2024-04-30 DIAGNOSIS — Z30.2 ENCOUNTER FOR STERILIZATION: ICD-10-CM

## 2024-04-30 LAB
ABO GROUP (TYPE) IN BLOOD: NORMAL
ANTIBODY SCREEN: NORMAL
ERYTHROCYTE [DISTWIDTH] IN BLOOD BY AUTOMATED COUNT: 17.2 % (ref 11.5–14.5)
FERRITIN SERPL-MCNC: 26 NG/ML
HCT VFR BLD AUTO: 35.6 % (ref 36–46)
HGB BLD-MCNC: 10.6 G/DL (ref 12–16)
HGB RETIC QN: 26 PG (ref 28–38)
IMMATURE RETIC FRACTION: 13.5 %
IRON SATN MFR SERPL: 9 %
IRON SERPL-MCNC: 35 UG/DL
MCH RBC QN AUTO: 23.6 PG (ref 26–34)
MCHC RBC AUTO-ENTMCNC: 29.8 G/DL (ref 32–36)
MCV RBC AUTO: 79 FL (ref 80–100)
NRBC BLD-RTO: 0 /100 WBCS (ref 0–0)
PLATELET # BLD AUTO: 320 X10*3/UL (ref 150–450)
RBC # BLD AUTO: 4.49 X10*6/UL (ref 4–5.2)
REFLEX ADDED, ANEMIA PANEL: NORMAL
RETICS #: 0.06 X10*6/UL (ref 0.02–0.08)
RETICS/RBC NFR AUTO: 1.4 % (ref 0.5–2)
RH FACTOR (ANTIGEN D): NORMAL
TIBC SERPL-MCNC: 403 UG/DL
UIBC SERPL-MCNC: 368 UG/DL
WBC # BLD AUTO: 8.3 X10*3/UL (ref 4.4–11.3)

## 2024-04-30 PROCEDURE — 86900 BLOOD TYPING SEROLOGIC ABO: CPT

## 2024-04-30 PROCEDURE — 86850 RBC ANTIBODY SCREEN: CPT

## 2024-04-30 PROCEDURE — 85045 AUTOMATED RETICULOCYTE COUNT: CPT

## 2024-04-30 PROCEDURE — 82728 ASSAY OF FERRITIN: CPT

## 2024-04-30 PROCEDURE — 36415 COLL VENOUS BLD VENIPUNCTURE: CPT

## 2024-04-30 PROCEDURE — 83550 IRON BINDING TEST: CPT

## 2024-04-30 PROCEDURE — 86901 BLOOD TYPING SEROLOGIC RH(D): CPT

## 2024-04-30 PROCEDURE — 85027 COMPLETE CBC AUTOMATED: CPT

## 2024-05-01 ENCOUNTER — ANESTHESIA EVENT (OUTPATIENT)
Dept: OPERATING ROOM | Facility: HOSPITAL | Age: 24
End: 2024-05-01
Payer: COMMERCIAL

## 2024-05-02 ENCOUNTER — PREP FOR PROCEDURE (OUTPATIENT)
Dept: OPERATING ROOM | Facility: HOSPITAL | Age: 24
End: 2024-05-02
Payer: COMMERCIAL

## 2024-05-02 ENCOUNTER — ANESTHESIA (OUTPATIENT)
Dept: OPERATING ROOM | Facility: HOSPITAL | Age: 24
End: 2024-05-02
Payer: COMMERCIAL

## 2024-05-02 DIAGNOSIS — Z30.2 REQUEST FOR STERILIZATION: Primary | ICD-10-CM

## 2024-05-02 RX ORDER — SODIUM CHLORIDE, SODIUM LACTATE, POTASSIUM CHLORIDE, CALCIUM CHLORIDE 600; 310; 30; 20 MG/100ML; MG/100ML; MG/100ML; MG/100ML
100 INJECTION, SOLUTION INTRAVENOUS CONTINUOUS
Status: CANCELLED | OUTPATIENT
Start: 2024-05-02

## 2024-05-05 NOTE — H&P
History Of Present Illness  Darcie Monroe is a 24 y.o. female presenting for tubal sterilization. Federal consents signed 24      Past Medical History  She has a past medical history of Acanthosis nigricans (2017), ADHD, Heart disease, unspecified (2018), History of pre-eclampsia, chlamydia infection, trichomoniasis, Migraine, unspecified, not intractable, without status migrainosus (2018), Morbid (severe) obesity due to excess calories (Multi) (2016), Other specified abnormal findings of blood chemistry (10/09/2018), Patellofemoral disorders, unspecified knee (2017), Personal history of other benign neoplasm (2017), Personal history of other diseases of the musculoskeletal system and connective tissue (2017), Personal history of other diseases of the nervous system and sense organs (2018), Personal history of other endocrine, nutritional and metabolic disease (2016), Personal history of transient ischemic attack (TIA), 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    Surgical History  She has a past surgical history that includes Other surgical history (2015); MR angio neck wo IV contrast (11/10/2018); MR angio head w and wo IV contrast (11/10/2018); Abdominal surgery (); and Cholecystectomy. Lap anand    OB History  OB History    Para Term  AB Living   2 2 2 0 0 2   SAB IAB Ectopic Multiple Live Births   0 0 0 0 2      # Outcome Date GA Lbr Marcelino/2nd Weight Sex Delivery Anes PTL Lv   2 Term 24 39w1d 27:47 / 00:03 2.99 kg F Vag-Spont EPI  LUIS ANTONIO   1 Term 20 39w1d  4.252 kg M Vag-Spont EPI N LUIS ANTONIO      Obstetric Comments   2023 1:19 PM - SM  Order for Compression stockings sent to [address] [facility] Chapa RN        2023 3:27 PM - MS  rx for breast pump sent to [facility] rn       2023 12:09 PM - SM  Maternity Belt order faxed to [address]  "Eduard MADERA        Sexual History  Social History     Substance and Sexual Activity   Sexual Activity Yes        Social History  She reports that she has never smoked. She has never used smokeless tobacco. She reports that she does not currently use drugs after having used the following drugs: Marijuana. She reports that she does not drink alcohol.    Family History  Family History   Problem Relation Name Age of Onset    Other (CARDIAC DEFIBRILLATOR) Mother      Cardiomyopathy Mother      Heart failure Mother      Obesity Mother          MORBID    Cardiomyopathy Mother's Brother      Heart failure Mother's Brother      Cardiomyopathy Maternal Grandmother      Heart failure Maternal Grandmother      Cardiomyopathy Maternal Grandfather      Heart failure Maternal Grandfather      Other (CARDIAC DEFIBRILLATOR) Other MATERNAL RELATIVES     Cardiomyopathy Other MATERNAL RELATIVES     Heart failure Other MATERNAL RELATIVES     Other (LEFT VENTRICULAR ASSIST DEVICE) Other MATERNAL RELATIVES     Arthritis Other OTHER     Asthma Other OTHER     Heart failure Other OTHER     Hypertension Other OTHER     Sleep apnea Other OTHER         Allergies  Patient has no known allergies.    Review of Systems   All other systems reviewed and are negative.       Physical Exam  Vitals reviewed.     General: no acute distress  HEENT: normocephalic, atraumatic  Heart: warm and well perfused  Lungs: no excessive respiratory effort  Abdomen: deferred to OR  : deferred to OR  Extremities: moving all extremities  Neuro: awake and conversant  Psych: appropriate mood and affect      Last Recorded Vitals  Blood pressure 136/75, pulse 79, temperature 36.1 °C (97 °F), temperature source Temporal, resp. rate 18, height 1.676 m (5' 6\"), weight 148 kg (325 lb 8 oz), last menstrual period 03/25/2024, SpO2 97%, not currently breastfeeding.    Relevant Results  Medications    Current Facility-Administered Medications:     lactated Ringer's infusion, 100 " mL/hr, intravenous, Continuous, Kadi Camargo MD       Assessment/Plan   Principal Problem:    Request for sterilization      Proceed to OR for tubal sterilization    Seen and discussed with Dr. Tim Gonzalez MD PGY-1

## 2024-05-06 ENCOUNTER — ANESTHESIA (OUTPATIENT)
Dept: OPERATING ROOM | Facility: HOSPITAL | Age: 24
End: 2024-05-06
Payer: COMMERCIAL

## 2024-05-06 ENCOUNTER — ANESTHESIA EVENT (OUTPATIENT)
Dept: OPERATING ROOM | Facility: HOSPITAL | Age: 24
End: 2024-05-06
Payer: COMMERCIAL

## 2024-05-06 ENCOUNTER — HOSPITAL ENCOUNTER (OUTPATIENT)
Facility: HOSPITAL | Age: 24
Setting detail: OUTPATIENT SURGERY
Discharge: HOME | End: 2024-05-06
Attending: STUDENT IN AN ORGANIZED HEALTH CARE EDUCATION/TRAINING PROGRAM | Admitting: STUDENT IN AN ORGANIZED HEALTH CARE EDUCATION/TRAINING PROGRAM
Payer: COMMERCIAL

## 2024-05-06 VITALS
HEART RATE: 80 BPM | RESPIRATION RATE: 15 BRPM | BODY MASS INDEX: 47.09 KG/M2 | OXYGEN SATURATION: 96 % | TEMPERATURE: 97 F | WEIGHT: 293 LBS | HEIGHT: 66 IN | SYSTOLIC BLOOD PRESSURE: 134 MMHG | DIASTOLIC BLOOD PRESSURE: 73 MMHG

## 2024-05-06 DIAGNOSIS — G89.18 POST-OP PAIN: Primary | ICD-10-CM

## 2024-05-06 DIAGNOSIS — Z30.2 REQUEST FOR STERILIZATION: ICD-10-CM

## 2024-05-06 LAB — PREGNANCY TEST URINE, POC: NEGATIVE

## 2024-05-06 PROCEDURE — 3700000002 HC GENERAL ANESTHESIA TIME - EACH INCREMENTAL 1 MINUTE: Performed by: STUDENT IN AN ORGANIZED HEALTH CARE EDUCATION/TRAINING PROGRAM

## 2024-05-06 PROCEDURE — 7100000001 HC RECOVERY ROOM TIME - INITIAL BASE CHARGE: Performed by: STUDENT IN AN ORGANIZED HEALTH CARE EDUCATION/TRAINING PROGRAM

## 2024-05-06 PROCEDURE — A58661 PR LAP,RMV  ADNEXAL STRUCTURE: Performed by: ANESTHESIOLOGY

## 2024-05-06 PROCEDURE — 3600000008 HC OR TIME - EACH INCREMENTAL 1 MINUTE - PROCEDURE LEVEL THREE: Performed by: STUDENT IN AN ORGANIZED HEALTH CARE EDUCATION/TRAINING PROGRAM

## 2024-05-06 PROCEDURE — A58661 PR LAP,RMV  ADNEXAL STRUCTURE: Performed by: NURSE ANESTHETIST, CERTIFIED REGISTERED

## 2024-05-06 PROCEDURE — 88302 TISSUE EXAM BY PATHOLOGIST: CPT | Mod: TC,SUR | Performed by: STUDENT IN AN ORGANIZED HEALTH CARE EDUCATION/TRAINING PROGRAM

## 2024-05-06 PROCEDURE — 7100000002 HC RECOVERY ROOM TIME - EACH INCREMENTAL 1 MINUTE: Performed by: STUDENT IN AN ORGANIZED HEALTH CARE EDUCATION/TRAINING PROGRAM

## 2024-05-06 PROCEDURE — 3700000001 HC GENERAL ANESTHESIA TIME - INITIAL BASE CHARGE: Performed by: STUDENT IN AN ORGANIZED HEALTH CARE EDUCATION/TRAINING PROGRAM

## 2024-05-06 PROCEDURE — 58661 LAPAROSCOPY REMOVE ADNEXA: CPT | Performed by: STUDENT IN AN ORGANIZED HEALTH CARE EDUCATION/TRAINING PROGRAM

## 2024-05-06 PROCEDURE — A4217 STERILE WATER/SALINE, 500 ML: HCPCS | Mod: SE | Performed by: STUDENT IN AN ORGANIZED HEALTH CARE EDUCATION/TRAINING PROGRAM

## 2024-05-06 PROCEDURE — 3600000003 HC OR TIME - INITIAL BASE CHARGE - PROCEDURE LEVEL THREE: Performed by: STUDENT IN AN ORGANIZED HEALTH CARE EDUCATION/TRAINING PROGRAM

## 2024-05-06 PROCEDURE — 7100000009 HC PHASE TWO TIME - INITIAL BASE CHARGE: Performed by: STUDENT IN AN ORGANIZED HEALTH CARE EDUCATION/TRAINING PROGRAM

## 2024-05-06 PROCEDURE — 2500000004 HC RX 250 GENERAL PHARMACY W/ HCPCS (ALT 636 FOR OP/ED): Mod: SE | Performed by: STUDENT IN AN ORGANIZED HEALTH CARE EDUCATION/TRAINING PROGRAM

## 2024-05-06 PROCEDURE — 2500000004 HC RX 250 GENERAL PHARMACY W/ HCPCS (ALT 636 FOR OP/ED): Mod: SE | Performed by: ANESTHESIOLOGY

## 2024-05-06 PROCEDURE — 2500000005 HC RX 250 GENERAL PHARMACY W/O HCPCS: Mod: SE | Performed by: STUDENT IN AN ORGANIZED HEALTH CARE EDUCATION/TRAINING PROGRAM

## 2024-05-06 PROCEDURE — 88302 TISSUE EXAM BY PATHOLOGIST: CPT | Performed by: PATHOLOGY

## 2024-05-06 PROCEDURE — 2500000005 HC RX 250 GENERAL PHARMACY W/O HCPCS: Mod: SE | Performed by: NURSE ANESTHETIST, CERTIFIED REGISTERED

## 2024-05-06 PROCEDURE — 2500000004 HC RX 250 GENERAL PHARMACY W/ HCPCS (ALT 636 FOR OP/ED): Mod: SE | Performed by: OBSTETRICS & GYNECOLOGY

## 2024-05-06 PROCEDURE — 2720000007 HC OR 272 NO HCPCS: Performed by: STUDENT IN AN ORGANIZED HEALTH CARE EDUCATION/TRAINING PROGRAM

## 2024-05-06 PROCEDURE — 81025 URINE PREGNANCY TEST: CPT | Performed by: ANESTHESIOLOGY

## 2024-05-06 PROCEDURE — 7100000010 HC PHASE TWO TIME - EACH INCREMENTAL 1 MINUTE: Performed by: STUDENT IN AN ORGANIZED HEALTH CARE EDUCATION/TRAINING PROGRAM

## 2024-05-06 PROCEDURE — 2500000004 HC RX 250 GENERAL PHARMACY W/ HCPCS (ALT 636 FOR OP/ED): Mod: SE | Performed by: NURSE ANESTHETIST, CERTIFIED REGISTERED

## 2024-05-06 RX ORDER — SODIUM CHLORIDE, SODIUM LACTATE, POTASSIUM CHLORIDE, CALCIUM CHLORIDE 600; 310; 30; 20 MG/100ML; MG/100ML; MG/100ML; MG/100ML
100 INJECTION, SOLUTION INTRAVENOUS CONTINUOUS
Status: DISCONTINUED | OUTPATIENT
Start: 2024-05-06 | End: 2024-05-06 | Stop reason: HOSPADM

## 2024-05-06 RX ORDER — DROPERIDOL 2.5 MG/ML
0.62 INJECTION, SOLUTION INTRAMUSCULAR; INTRAVENOUS ONCE AS NEEDED
Status: DISCONTINUED | OUTPATIENT
Start: 2024-05-06 | End: 2024-05-06 | Stop reason: HOSPADM

## 2024-05-06 RX ORDER — LIDOCAINE 50 MG/G
1 PATCH TOPICAL DAILY
Qty: 5 PATCH | Refills: 2 | Status: SHIPPED | OUTPATIENT
Start: 2024-05-06

## 2024-05-06 RX ORDER — ONDANSETRON HYDROCHLORIDE 2 MG/ML
INJECTION, SOLUTION INTRAVENOUS AS NEEDED
Status: DISCONTINUED | OUTPATIENT
Start: 2024-05-06 | End: 2024-05-06

## 2024-05-06 RX ORDER — FENTANYL CITRATE 50 UG/ML
INJECTION, SOLUTION INTRAMUSCULAR; INTRAVENOUS AS NEEDED
Status: DISCONTINUED | OUTPATIENT
Start: 2024-05-06 | End: 2024-05-06

## 2024-05-06 RX ORDER — ACETAMINOPHEN 500 MG
1000 TABLET ORAL EVERY 6 HOURS PRN
Qty: 120 TABLET | Refills: 1 | Status: SHIPPED | OUTPATIENT
Start: 2024-05-06

## 2024-05-06 RX ORDER — OXYCODONE HYDROCHLORIDE 5 MG/1
5 TABLET ORAL EVERY 4 HOURS PRN
Status: DISCONTINUED | OUTPATIENT
Start: 2024-05-06 | End: 2024-05-06 | Stop reason: HOSPADM

## 2024-05-06 RX ORDER — OXYCODONE HYDROCHLORIDE 5 MG/1
5 TABLET ORAL EVERY 6 HOURS PRN
Qty: 8 TABLET | Refills: 0 | Status: SHIPPED | OUTPATIENT
Start: 2024-05-06

## 2024-05-06 RX ORDER — LIDOCAINE HYDROCHLORIDE 20 MG/ML
INJECTION, SOLUTION INFILTRATION; PERINEURAL AS NEEDED
Status: DISCONTINUED | OUTPATIENT
Start: 2024-05-06 | End: 2024-05-06

## 2024-05-06 RX ORDER — LIDOCAINE HYDROCHLORIDE 10 MG/ML
0.1 INJECTION, SOLUTION EPIDURAL; INFILTRATION; INTRACAUDAL; PERINEURAL ONCE
Status: DISCONTINUED | OUTPATIENT
Start: 2024-05-06 | End: 2024-05-06 | Stop reason: HOSPADM

## 2024-05-06 RX ORDER — HYDROMORPHONE HYDROCHLORIDE 1 MG/ML
INJECTION, SOLUTION INTRAMUSCULAR; INTRAVENOUS; SUBCUTANEOUS AS NEEDED
Status: DISCONTINUED | OUTPATIENT
Start: 2024-05-06 | End: 2024-05-06

## 2024-05-06 RX ORDER — GLYCOPYRROLATE 0.2 MG/ML
INJECTION INTRAMUSCULAR; INTRAVENOUS AS NEEDED
Status: DISCONTINUED | OUTPATIENT
Start: 2024-05-06 | End: 2024-05-06

## 2024-05-06 RX ORDER — METHOCARBAMOL 100 MG/ML
1000 INJECTION, SOLUTION INTRAMUSCULAR; INTRAVENOUS ONCE
Status: COMPLETED | OUTPATIENT
Start: 2024-05-06 | End: 2024-05-06

## 2024-05-06 RX ORDER — WATER 1 ML/ML
IRRIGANT IRRIGATION AS NEEDED
Status: DISCONTINUED | OUTPATIENT
Start: 2024-05-06 | End: 2024-05-06 | Stop reason: HOSPADM

## 2024-05-06 RX ORDER — NALOXONE HYDROCHLORIDE 4 MG/.1ML
4 SPRAY NASAL AS NEEDED
Qty: 2 EACH | Refills: 0 | Status: SHIPPED | OUTPATIENT
Start: 2024-05-06

## 2024-05-06 RX ORDER — HYDROMORPHONE HYDROCHLORIDE 1 MG/ML
0.5 INJECTION, SOLUTION INTRAMUSCULAR; INTRAVENOUS; SUBCUTANEOUS EVERY 5 MIN PRN
Status: DISCONTINUED | OUTPATIENT
Start: 2024-05-06 | End: 2024-05-06 | Stop reason: HOSPADM

## 2024-05-06 RX ORDER — ALBUTEROL SULFATE 0.83 MG/ML
2.5 SOLUTION RESPIRATORY (INHALATION) ONCE AS NEEDED
Status: DISCONTINUED | OUTPATIENT
Start: 2024-05-06 | End: 2024-05-06 | Stop reason: HOSPADM

## 2024-05-06 RX ORDER — PHENYLEPHRINE HCL IN 0.9% NACL 0.4MG/10ML
SYRINGE (ML) INTRAVENOUS AS NEEDED
Status: DISCONTINUED | OUTPATIENT
Start: 2024-05-06 | End: 2024-05-06

## 2024-05-06 RX ORDER — ROCURONIUM BROMIDE 10 MG/ML
INJECTION, SOLUTION INTRAVENOUS AS NEEDED
Status: DISCONTINUED | OUTPATIENT
Start: 2024-05-06 | End: 2024-05-06

## 2024-05-06 RX ORDER — BUPIVACAINE HYDROCHLORIDE 5 MG/ML
INJECTION, SOLUTION PERINEURAL AS NEEDED
Status: DISCONTINUED | OUTPATIENT
Start: 2024-05-06 | End: 2024-05-06 | Stop reason: HOSPADM

## 2024-05-06 RX ORDER — MIDAZOLAM HYDROCHLORIDE 1 MG/ML
INJECTION INTRAMUSCULAR; INTRAVENOUS AS NEEDED
Status: DISCONTINUED | OUTPATIENT
Start: 2024-05-06 | End: 2024-05-06

## 2024-05-06 RX ORDER — MIDAZOLAM HYDROCHLORIDE 1 MG/ML
1 INJECTION INTRAMUSCULAR; INTRAVENOUS ONCE AS NEEDED
Status: DISCONTINUED | OUTPATIENT
Start: 2024-05-06 | End: 2024-05-06 | Stop reason: HOSPADM

## 2024-05-06 RX ORDER — SODIUM CHLORIDE, SODIUM LACTATE, POTASSIUM CHLORIDE, CALCIUM CHLORIDE 600; 310; 30; 20 MG/100ML; MG/100ML; MG/100ML; MG/100ML
INJECTION, SOLUTION INTRAVENOUS CONTINUOUS PRN
Status: DISCONTINUED | OUTPATIENT
Start: 2024-05-06 | End: 2024-05-06

## 2024-05-06 RX ORDER — ESMOLOL HYDROCHLORIDE 10 MG/ML
INJECTION INTRAVENOUS AS NEEDED
Status: DISCONTINUED | OUTPATIENT
Start: 2024-05-06 | End: 2024-05-06

## 2024-05-06 RX ORDER — IBUPROFEN 600 MG/1
600 TABLET ORAL EVERY 6 HOURS PRN
Qty: 120 TABLET | Refills: 1 | Status: SHIPPED | OUTPATIENT
Start: 2024-05-06

## 2024-05-06 RX ORDER — POLYETHYLENE GLYCOL 3350 17 G/17G
17 POWDER, FOR SOLUTION ORAL DAILY
Qty: 20 EACH | Refills: 0 | Status: SHIPPED | OUTPATIENT
Start: 2024-05-06

## 2024-05-06 RX ORDER — MEPERIDINE HYDROCHLORIDE 25 MG/ML
12.5 INJECTION INTRAMUSCULAR; INTRAVENOUS; SUBCUTANEOUS EVERY 10 MIN PRN
Status: DISCONTINUED | OUTPATIENT
Start: 2024-05-06 | End: 2024-05-06 | Stop reason: HOSPADM

## 2024-05-06 RX ORDER — PROPOFOL 10 MG/ML
INJECTION, EMULSION INTRAVENOUS AS NEEDED
Status: DISCONTINUED | OUTPATIENT
Start: 2024-05-06 | End: 2024-05-06

## 2024-05-06 RX ORDER — HYDROMORPHONE HYDROCHLORIDE 1 MG/ML
0.2 INJECTION, SOLUTION INTRAMUSCULAR; INTRAVENOUS; SUBCUTANEOUS EVERY 5 MIN PRN
Status: DISCONTINUED | OUTPATIENT
Start: 2024-05-06 | End: 2024-05-06 | Stop reason: HOSPADM

## 2024-05-06 RX ORDER — SODIUM CHLORIDE 0.9 G/100ML
IRRIGANT IRRIGATION AS NEEDED
Status: DISCONTINUED | OUTPATIENT
Start: 2024-05-06 | End: 2024-05-06 | Stop reason: HOSPADM

## 2024-05-06 RX ORDER — LABETALOL HYDROCHLORIDE 5 MG/ML
5 INJECTION, SOLUTION INTRAVENOUS ONCE AS NEEDED
Status: DISCONTINUED | OUTPATIENT
Start: 2024-05-06 | End: 2024-05-06 | Stop reason: HOSPADM

## 2024-05-06 RX ADMIN — PROPOFOL 20 MG: 10 INJECTION, EMULSION INTRAVENOUS at 14:26

## 2024-05-06 RX ADMIN — ESMOLOL HYDROCHLORIDE 20 MG: 100 INJECTION, SOLUTION INTRAVENOUS at 14:52

## 2024-05-06 RX ADMIN — ROCURONIUM BROMIDE 50 MG: 10 INJECTION INTRAVENOUS at 13:42

## 2024-05-06 RX ADMIN — PROPOFOL 200 MG: 10 INJECTION, EMULSION INTRAVENOUS at 13:41

## 2024-05-06 RX ADMIN — FENTANYL CITRATE 25 MCG: 50 INJECTION, SOLUTION INTRAMUSCULAR; INTRAVENOUS at 14:24

## 2024-05-06 RX ADMIN — HYDROMORPHONE HYDROCHLORIDE 0.5 MG: 1 INJECTION, SOLUTION INTRAMUSCULAR; INTRAVENOUS; SUBCUTANEOUS at 17:01

## 2024-05-06 RX ADMIN — SODIUM CHLORIDE, POTASSIUM CHLORIDE, SODIUM LACTATE AND CALCIUM CHLORIDE: 600; 310; 30; 20 INJECTION, SOLUTION INTRAVENOUS at 13:51

## 2024-05-06 RX ADMIN — FENTANYL CITRATE 25 MCG: 50 INJECTION, SOLUTION INTRAMUSCULAR; INTRAVENOUS at 14:27

## 2024-05-06 RX ADMIN — PROPOFOL 50 MG: 10 INJECTION, EMULSION INTRAVENOUS at 13:47

## 2024-05-06 RX ADMIN — ONDANSETRON 4 MG: 2 INJECTION INTRAMUSCULAR; INTRAVENOUS at 14:37

## 2024-05-06 RX ADMIN — HYDROMORPHONE HYDROCHLORIDE 0.5 MG: 1 INJECTION, SOLUTION INTRAMUSCULAR; INTRAVENOUS; SUBCUTANEOUS at 15:58

## 2024-05-06 RX ADMIN — HYDROMORPHONE HYDROCHLORIDE 0.4 MG: 1 INJECTION, SOLUTION INTRAMUSCULAR; INTRAVENOUS; SUBCUTANEOUS at 14:53

## 2024-05-06 RX ADMIN — LIDOCAINE HYDROCHLORIDE 60 MG: 20 INJECTION, SOLUTION INFILTRATION; PERINEURAL at 13:41

## 2024-05-06 RX ADMIN — DEXAMETHASONE SODIUM PHOSPHATE 4 MG: 4 INJECTION INTRA-ARTICULAR; INTRALESIONAL; INTRAMUSCULAR; INTRAVENOUS; SOFT TISSUE at 13:39

## 2024-05-06 RX ADMIN — Medication 80 MCG: at 13:57

## 2024-05-06 RX ADMIN — ESMOLOL HYDROCHLORIDE 30 MG: 100 INJECTION, SOLUTION INTRAVENOUS at 14:06

## 2024-05-06 RX ADMIN — PROPOFOL 10 MG: 10 INJECTION, EMULSION INTRAVENOUS at 14:45

## 2024-05-06 RX ADMIN — FENTANYL CITRATE 50 MCG: 50 INJECTION, SOLUTION INTRAMUSCULAR; INTRAVENOUS at 13:41

## 2024-05-06 RX ADMIN — HYDROMORPHONE HYDROCHLORIDE 0.5 MG: 1 INJECTION, SOLUTION INTRAMUSCULAR; INTRAVENOUS; SUBCUTANEOUS at 15:47

## 2024-05-06 RX ADMIN — METHOCARBAMOL 1000 MG: 100 INJECTION INTRAMUSCULAR; INTRAVENOUS at 17:01

## 2024-05-06 RX ADMIN — GLYCOPYRROLATE 0.4 MG: 0.2 INJECTION INTRAMUSCULAR; INTRAVENOUS at 15:00

## 2024-05-06 RX ADMIN — PROPOFOL 20 MG: 10 INJECTION, EMULSION INTRAVENOUS at 14:39

## 2024-05-06 RX ADMIN — MIDAZOLAM HYDROCHLORIDE 2 MG: 1 INJECTION, SOLUTION INTRAMUSCULAR; INTRAVENOUS at 13:28

## 2024-05-06 RX ADMIN — Medication 50 MG: at 13:45

## 2024-05-06 RX ADMIN — PROPOFOL 100 MG: 10 INJECTION, EMULSION INTRAVENOUS at 13:42

## 2024-05-06 RX ADMIN — HYDROMORPHONE HYDROCHLORIDE 0.5 MG: 1 INJECTION, SOLUTION INTRAMUSCULAR; INTRAVENOUS; SUBCUTANEOUS at 16:07

## 2024-05-06 RX ADMIN — ROCURONIUM BROMIDE 20 MG: 10 INJECTION INTRAVENOUS at 14:15

## 2024-05-06 RX ADMIN — ESMOLOL HYDROCHLORIDE 20 MG: 100 INJECTION, SOLUTION INTRAVENOUS at 13:48

## 2024-05-06 RX ADMIN — HYDROMORPHONE HYDROCHLORIDE 0.5 MG: 1 INJECTION, SOLUTION INTRAMUSCULAR; INTRAVENOUS; SUBCUTANEOUS at 15:40

## 2024-05-06 RX ADMIN — HYDROMORPHONE HYDROCHLORIDE 0.5 MG: 1 INJECTION, SOLUTION INTRAMUSCULAR; INTRAVENOUS; SUBCUTANEOUS at 16:35

## 2024-05-06 RX ADMIN — SODIUM CHLORIDE, POTASSIUM CHLORIDE, SODIUM LACTATE AND CALCIUM CHLORIDE: 600; 310; 30; 20 INJECTION, SOLUTION INTRAVENOUS at 13:28

## 2024-05-06 ASSESSMENT — PAIN SCALES - GENERAL
PAINLEVEL_OUTOF10: 7
PAINLEVEL_OUTOF10: 0 - NO PAIN
PAINLEVEL_OUTOF10: 7
PAIN_LEVEL: 2
PAINLEVEL_OUTOF10: 0 - NO PAIN
PAINLEVEL_OUTOF10: 7
PAINLEVEL_OUTOF10: 5 - MODERATE PAIN

## 2024-05-06 ASSESSMENT — COLUMBIA-SUICIDE SEVERITY RATING SCALE - C-SSRS
6. HAVE YOU EVER DONE ANYTHING, STARTED TO DO ANYTHING, OR PREPARED TO DO ANYTHING TO END YOUR LIFE?: NO
2. HAVE YOU ACTUALLY HAD ANY THOUGHTS OF KILLING YOURSELF?: NO
1. IN THE PAST MONTH, HAVE YOU WISHED YOU WERE DEAD OR WISHED YOU COULD GO TO SLEEP AND NOT WAKE UP?: NO

## 2024-05-06 ASSESSMENT — PAIN - FUNCTIONAL ASSESSMENT
PAIN_FUNCTIONAL_ASSESSMENT: 0-10

## 2024-05-06 NOTE — ANESTHESIA PREPROCEDURE EVALUATION
Patient: Darcie Monroe    Procedure Information       Date/Time: 24 1250    Procedure: laparoscopic bilateral salpingectomy (Bilateral)    Location: [facility] OR  [facility] OR    Surgeons: Yanna Dumont MD        ALLERGIES:  No Known Allergies     MEDICAL HISTORY:  Past Medical History:   Diagnosis Date    Acanthosis nigricans 2017    Acanthosis nigricans    ADHD     Heart disease, unspecified 2018    Left ventricular dysfunction    History of pre-eclampsia     Hx of chlamydia infection     Hx of trichomoniasis     Migraine, unspecified, not intractable, without status migrainosus 2018    Headache, migraine    Morbid (severe) obesity due to excess calories (Multi) 2016    Morbid obesity    Other specified abnormal findings of blood chemistry 10/09/2018    Elevated TSH    Patellofemoral disorders, unspecified knee 2017    Patellofemoral syndrome    Personal history of other benign neoplasm 2017    History of lymphangioma    Personal history of other diseases of the musculoskeletal system and connective tissue 2017    History of genu valgum    Personal history of other diseases of the nervous system and sense organs 2018    History of Bell's palsy    Personal history of other endocrine, nutritional and metabolic disease 2016    History of insulin resistance    Personal history of transient ischemic attack (TIA), and cerebral infarction without residual deficits     History of cerebrovascular accident    Vitamin D deficiency, unspecified 2021    Vitamin D deficiency        Relevant Problems   Neuro   (+) Hx of Bell's palsy   (+) Post traumatic stress disorder (PTSD)      Hematology   (+) Anemia during pregnancy in third trimester ([facility])      Gravid and    (+) History of pre-eclampsia in prior pregnancy, currently pregnant ([facility])      Mental Health   (+) ADHD (attention deficit hyperactivity disorder)        SURGICAL  "HISTORY:  Past Surgical History:   Procedure Laterality Date    ABDOMINAL SURGERY  2017    MR HEAD ANGIO W AND WO IV CONTRAST  11/10/2018    MR HEAD ANGIO W AND WO IV CONTRAST 11/10/2018 [facility]    MR NECK ANGIO WO IV CONTRAST  11/10/2018    MR NECK ANGIO WO IV CONTRAST 11/10/2018 [facility]    OTHER SURGICAL HISTORY  02/09/2015    Tonsillectomy Over Age 13        MEDICATIONS:  Current Outpatient Medications   Medication Instructions    ferrous sulfate, 325 mg ferrous sulfate, tablet 1 tablet, oral, 2 times daily        VITALS:      4/24/2024     2:15 PM 4/4/2024     1:27 PM 2/12/2024    10:56 PM   Vitals   Systolic 138 129    Diastolic 88 84    Heart Rate 76 84 93   Height (in) 1.676 m (5' 6\")     Weight (lb) 332.7 332.8    BMI 53.7 kg/m2 53.72 kg/m2    BSA (m2) 2.65 m2 2.65 m2    Visit Report Report         LABS:   BMP   Lab Results   Component Value Date    GLUCOSE 66 (L) 01/28/2024    CALCIUM 8.6 01/28/2024     01/28/2024    K 3.9 01/28/2024    CO2 22 01/28/2024     01/28/2024    BUN 6 01/28/2024    CREATININE 0.60 01/28/2024   , LFT   Lab Results   Component Value Date    ALT 16 01/28/2024    AST 19 01/28/2024    ALKPHOS 168 (H) 01/28/2024    BILITOT 0.5 01/28/2024   , CBC  Lab Results   Component Value Date    WBC 8.3 04/30/2024    HGB 10.6 (L) 04/30/2024    HCT 35.6 (L) 04/30/2024    MCV 79 (L) 04/30/2024     04/30/2024    , A1C   Lab Results   Component Value Date    HGBA1C 5.3 08/31/2023       IMAGES:  EKG          Encounter Date: 02/12/24   ECG 12 lead   Result Value    Ventricular Rate 76    Atrial Rate 76    RI Interval 170    QRS Duration 74    QT Interval 380    QTC Calculation(Bazett) 427    P Axis 56    R Axis 49    T Axis 47    QRS Count 12    Q Onset 223    P Onset 138    P Offset 186    T Offset 413    QTC Fredericia 411    Narrative    Normal sinus rhythm with sinus arrhythmia  Nonspecific T wave abnormality  Abnormal ECG  When compared with ECG of " 12-FEB-2024 21:56,  Non-specific change in ST segment in Inferior leads  Non-specific change in ST segment in Anterior leads    See ED provider note for full interpretation and clinical correlation  Confirmed by Jeane Arcos ([id number]) on 2/12/2024 11:10:57 PM     , CXR       XR chest 1 view 02/15/2024    Narrative  * * *Final Report* * *    DATE OF EXAM: Feb 15 2024  7:15AM    LUX   5376  -  XR CHEST 1V FRONTAL PORT  / ACCESSION #  [id number]    PROCEDURE REASON: Chest pain    * * * * Physician Interpretation * * * *    EXAMINATION:  CHEST RADIOGRAPH (PORTABLE SINGLE VIEW AP)    Exam Date/Time:  2/15/2024 7:15 AM  CLINICAL HISTORY: Chest pain  MQ:  XCPR_5  Comparison:  02/13/2024 CT    RESULT:    Lines, tubes, and devices:  None.    Lungs and pleura:  Suboptimal inspiration.  No consolidation.  No pleural   effusion.  No pneumothorax.    Cardiomediastinal silhouette:  Within normal limits for technique.    Other:  .    Impression  IMPRESSION:    No acute chest process radiographically.    , CT Head/Neck       CT HEAD WO IV CONTRAST 02/19/2023    Narrative  * * *Final Report* * *    DATE OF EXAM: Feb 19 2023  2:20PM    AYDEE   0504  -  CT BRAIN WO IVCON   / ACCESSION #  [id number]    PROCEDURE REASON: Head trauma, moderate-severe    * * * * Physician Interpretation * * * *    EXAMINATION: CT BRAIN WO IVCON    CLINICAL HISTORY: Head trauma, moderate-severe  Injury at work yesterday    TECHNIQUE:  Serial axial images without IV contrast were obtained from  the vertex to the foramen magnum.  MQ:  CTBWO_3    CT Radiation dose: Integrated Dose-Length Product (DLP) for this visit =  778  mGy*cm  CT Dose Reduction Employed: No dose reduction techniques were required    COMPARISON: None.      RESULT:    Post-operative change: None.    Acute change: No evidence of an acute infarct or other acute parenchymal  process.    Hemorrhage: No evidence of acute intracranial hemorrhage. ECASS  hemorrhagic transformation score: Not  Applicable    Mass Lesion / Mass Effect: There is no evidence of an intracranial mass  or extraaxial fluid collection.  No significant mass effect.    Chronic change: None apparent.    Parenchyma: There is no significant volume loss.  The brain parenchyma is  otherwise within normal limits for age.    Ventricles: The ventricles are within normal limits of size and  configuration for age.  Incidental cavum septum pellucidum.    Paranasal sinuses and skull base: Paranasal sinuses are grossly clear.  Calvarium appears intact.     (topogram) images: No additional findings.    Impression  IMPRESSION:    No acute intracranial abnormality.    , CT Chest        CT angio chest w and wo IV contrast 02/13/2024    Narrative  * * *Final Report* * *    DATE OF EXAM: Feb 13 2024  6:15AM    AYDEE   0540  -  CT CHEST W IVCON PE  / ACCESSION #  [id number]    PROCEDURE REASON: Pulmonary embolism (PE) suspected, high prob    * * * * Physician Interpretation * * * *    EXAMINATION:  CHEST CT WITH CONTRAST (PULMONARY EMBOLISM PROTOCOL)    CLINICAL HISTORY: Breath.  Evaluate for pulmonary embolism.    Technique:  Spiral CT acquisition of the chest from the thoracic inlet to  the upper abdomen following IV contrast.  Axial 1 and 3 mm thick slices  plus coronal and sagittal reformatted images.  MQ:  CTCP_5  Contrast:  100 mL Omnipaque 350 IV  CT Radiation dose: Integrated Dose-length product (DLP) for this visit =  752 mGy*cm  CT Dose Reduction Employed: Automated exposure control(AEC) and iterative  recon    Comparison:  No relevant prior studies available.    RESULT:    Limitations:  None.    Evaluation for thromboembolic disease:  - Right heart chambers:  No thromboembolic disease.  - Main pulmonary arteries:  No thromboembolic disease.  - Lobar pulmonary arteries:  No thromboembolic disease.  - Segmental pulmonary arteries:  No thromboembolic disease.  - Subsegmental pulmonary arteries:  No thromboembolic disease.  - Additional  pulmonary artery findings:  The main pulmonary artery  is normal in caliber.    Lines, tubes, and devices:  None.    Lung parenchyma and airways: No consolidation. No suspicious pulmonary  nodule. The central airways are patent.    Pleural space:  No pleural effusion.  No pleural thickening.    Lower neck, lymph nodes, and mediastinum:  The imaged thyroid gland is  normal.  No lymphadenopathy in the supraclavicular, axillary,  mediastinal, or hilar regions.    Heart, pericardium, and thoracic vessels:  The thoracic aorta is normal  in caliber. The cardiac chambers are normal in size. No coronary artery  atherosclerotic calcifications are noted, although the study is not  optimized for coronary assessment. No pericardial effusion or thickening.    Bones and soft tissues:  No destructive bone lesion. Chest wall is  unremarkable.    Upper abdomen:  No abnormality in the imaged upper abdomen.     (topogram) images: No acute findings.    Impression  IMPRESSION:    No CT evidence of pulmonary embolism.     , CT Abdomin       CT abdomen pelvis w IV contrast 02/19/2024    Narrative  * * *Final Report* * *    DATE OF EXAM: Feb 19 2024  2:45PM    [facility]   0530  -  CT ABD/PEL W IVCON  / ACCESSION #  [id number]    PROCEDURE REASON: Abdominal abscess/infection suspected    * * * * Physician Interpretation * * * *    EXAMINATION:  CT ABDOMEN AND PELVIS WITH IV CONTRAST    CLINICAL HISTORY: Abdominal abscess suspected. s/p lap cholecystectomy on  02/17/24    TECHNIQUE: CT of the abdomen and pelvis was performed using standard  technique, scanning from just above the dome of the diaphragm to the  symphysis pubis.  MQ:  CTAP_3    Contrast:  IV:  100 ml of Omnipaque 350  Oral:  450 ml of Omni 240 10-25ml diluted with water    CT Radiation dose: Integrated Dose-length product (DLP) for this visit =  1690 mGy*cm.  CT Dose Reduction Employed: Automated exposure control (AEC)    COMPARISON: MRI 02/16/2024      RESULT:    Lower Thorax:  Normal.    Liver: Normal.    Gallbladder/Biliary Tree: Status post cholecystectomy, with small amount  of noncollected fluid and air in the gallbladder fossa, postsurgical in  nature.  Common bile duct stent in place with associated pneumobilia.  No  biliary ductal dilatation.    Spleen: Normal.    Pancreas: Pancreatic duct stent in place.  No pancreatic ductal  dilatation.  No definite focal lesions.    Adrenal Glands: Normal.    Kidneys/Ureters: Normal.    Gastrointestinal: Normal.    Bladder: Normal.    Uterus/Adnexa: Normal.    Lymph Nodes: Normal.    Vessels: Normal.    Peritoneum/Retroperitoneum: Normal.    Bones/Soft Tissues: Small amount of subcutaneous air, postsurgical in  nature.  No loculated fluid collections.    Impression  IMPRESSION:    1.  Status post cholecystectomy, with small amount of noncollected fluid  and air in the gallbladder fossa, postsurgical in nature.  2.  Common bile duct stent in place with associated pneumobilia.  No  biliary ductal dilatation.  3.  Pancreatic duct stent in place.  No pancreatic ductal dilatation.    SOCIAL:  Social History     Tobacco Use   Smoking Status Former    Types: Cigarettes, Pipe   Smokeless Tobacco Never      Social History     Substance and Sexual Activity   Alcohol Use Never      Social History     Substance and Sexual Activity   Drug Use Not Currently    Types: Marijuana        NPO STATUS:  No data recorded    Clinical Areas Reviewed:                   Anesthesia Assessment:    Physical Exam    Airway  Mallampati: II  TM distance: >3 FB  Neck ROM: full     Cardiovascular - normal exam  Rhythm: regular  Rate: normal     Dental    Pulmonary - normal exam     Abdominal - normal exam             Anesthesia Plan    History of general anesthesia?: yes  History of complications of general anesthesia?: no    ASA 3     general     intravenous induction   Postoperative administration of opioids is intended.  Anesthetic plan and risks discussed with patient.  Use  of blood products discussed with patient who.    Plan discussed with CAA and attending.

## 2024-05-06 NOTE — DISCHARGE INSTRUCTIONS
Post-Operation Instructions  What care is needed at home?  Ask your doctor what you need to do when you go home. Make sure you ask questions if you do not understand what you need to do.  You can take off the bandaid covering your incision in 1-2 days.  You can shower, but do not scrub your incisions, let warm soapy water flow over them.  Do not lift things over 10 pounds (4.5 kg).  Be sure to wash your hands before and after touching your wound or dressing.  Your bowel movements may take some time to get back to normal. Eat small meals high in fiber to avoid hard stools. Drink 6 to 8 glasses of water each day.  Try to walk each day. Start by walking a little more than you did the day before. Walking boosts blood flow and helps prevent lung, belly, and blood problems.    What follow-up care is needed?  We will call you to schedule a follow up visit with your surgeon. Be sure to keep your visits.  You have stitches. They will dissolve on their own. The surgical glue will fall off on its own as well.    What drugs may be needed?  We are sending you home with ibuprofen, tylenol, oxycodone (for pain), and Miralax (a stool softener).     What problems could happen?  Infection  Wound opening  Heavy blood loss  Blood clots in your legs or lungs  Damage to your bowel, bladder, and other organs inside the belly  Trouble passing bowel movements    When do I need to call the doctor?  Signs of infection such as a fever of 100.4°F (38°C) or higher, chills, pain with passing urine.  Signs of wound infection such as swelling, redness, warmth around the wound; too much pain when touched; yellowish, greenish, or bloody discharge; foul smell coming from the cut site; cut site opens up.  Excessive blood in your sanitary pads or more than six soaked pads in a day. (Spotting is normal).  Smelly green or dark yellow vaginal discharge  Upset stomach, throwing up, or very bad belly pain  Pain not helped by drugs you are taking  No bowel  movement after 3 days  If you feel the need to pass urine but urine will not come out even after 6 hours  Swelling in your leg or arm that is much greater on one side than the other    [place] in 2 weeks

## 2024-05-06 NOTE — ANESTHESIA POSTPROCEDURE EVALUATION
Patient: Darcie MORA Monroe    Procedure Summary       Date: 05/06/24 Room / Location: [facility] OR 01 / [facility] [place] [facility]    Anesthesia Start: 1328 Anesthesia Stop: 1515    Procedure: laparoscopic bilateral salpingectomy (Bilateral) Diagnosis:       Request for sterilization      (Request for sterilization [Z30.2])    Surgeons: Yanna Dumont MD Responsible Provider: Gorge Winters MD    Anesthesia Type: general ASA Status: 3            Anesthesia Type: general    Vitals Value Taken Time   /88 05/06/24 1531   Temp 36.6 °C (97.9 °F) 05/06/24 1515   Pulse 92 05/06/24 1533   Resp 15 05/06/24 1533   SpO2 94 % 05/06/24 1533   Vitals shown include unfiled device data.    Anesthesia Post Evaluation    Patient location during evaluation: PACU  Patient participation: complete - patient participated  Level of consciousness: awake and alert  Pain score: 2  Pain management: adequate  Airway patency: patent  Cardiovascular status: acceptable  Respiratory status: acceptable  Hydration status: acceptable  Postoperative Nausea and Vomiting: none        There were no known notable events for this encounter.

## 2024-05-06 NOTE — ANESTHESIA PROCEDURE NOTES
Airway  Date/Time: 5/6/2024 1:44 PM  Urgency: elective    Airway not difficult    Staffing  Performed: CRNA   Authorized by: Gorge Winters MD    Performed by: LUIS MANUEL Doss-LUDIVINA  Patient location during procedure: OR    Indications and Patient Condition  Indications for airway management: anesthesia  Spontaneous Ventilation: absent  Sedation level: deep  Preoxygenated: yes  Patient position: ramp  MILS maintained throughout  Mask difficulty assessment: 1 - vent by mask  Planned trial extubation    Final Airway Details  Final airway type: endotracheal airway      Successful airway: ETT  Cuffed: yes   Successful intubation technique: direct laryngoscopy  Facilitating devices/methods: intubating stylet  Endotracheal tube insertion site: oral  Blade: Stephen  Blade size: #3  ETT size (mm): 7.5  Cormack-Lehane Classification: grade I - full view of glottis  Placement verified by: chest auscultation and capnometry   Measured from: lips  ETT to lips (cm): 22  Number of attempts at approach: 1  Ventilation between attempts: none  Number of other approaches attempted: 0    Additional Comments  Lips and teeth in preanesthetic condition

## 2024-05-06 NOTE — OP NOTE
laparoscopic bilateral salpingectomy (B) Operative Note     Date: 2024  OR Location: [facility] OR    Name: Darcie Monroe, : 2000, Age: 24 y.o., MRN: [id number], Sex: female    Diagnosis  Pre-op Diagnosis     * Request for sterilization [Z30.2] Post-op Diagnosis     * Request for sterilization [Z30.2]     Procedures  laparoscopic bilateral salpingectomy  60873 - [place] LAPAROSCOPY W/RMVL ADNEXAL STRUCTURES      Surgeons      * Yanna Dumont - Primary    Resident/Fellow/Other Assistant:  Surgeons and Role:     * Nidia Gonzalez MD - Resident - Assisting     * Elizabeth Caicedo MD - Fellow    Procedure Summary  Anesthesia: General  ASA: III  Anesthesia Staff: Anesthesiologist: Gorge Winters MD  CRNA: LUIS MANUEL Doss-CRNA  Estimated Blood Loss: 5 mL  Intra-op Medications:   Administrations occurring from 1200 to 1405 on 24:   Medication Name Total Dose   lactated Ringer's infusion Cannot be calculated              Anesthesia Record               Intraprocedure I/O Totals          Output    Urine 400 mL    Est. Blood Loss 5 mL    Total Output 405 mL          Specimen:   ID Type Source Tests Collected by Time   1 : BILATERAL FALLOPIAN TUBES Tissue FALLOPIAN TUBE SALPINGECTOMY LEFT AND RIGHT SURGICAL PATHOLOGY EXAM Yanna Dumont MD 2024 1437        Staff:   Circulator: Nemesio Davis RN; Emilia Weldon RN; Stfefen Watts RN  Scrub Person: Yanna Menchaca RN         Drains and/or Catheters:   Urethral Catheter Non-latex 16 Fr. (Active)       Findings: Grossly normal pelvic and upper abdominal anatomy.    Indications: Darcie Monroe is an 24 y.o. female who is having surgery for Request for sterilization [Z30.2].     The patient was seen in the preoperative area. The risks, benefits, complications, treatment options, non-operative alternatives, expected recovery and outcomes were discussed with the patient. The possibilities of injury to surrounding structures, bleeding, recurrent  infection, the need for additional procedures, failure to diagnose a condition, and creating a complication requiring transfusion or operation were discussed with the patient. The patient concurred with the proposed plan, giving informed consent.  The site of surgery was properly noted/marked if necessary per policy. The patient has been actively warmed in preoperative area. Preoperative antibiotics are not indicated. Venous thrombosis prophylaxis have been ordered including bilateral sequential compression devices    Description of Procedure  Patient was taken to the operating room where she was prepared and draped in the usual sterile fashion.  A Verma catheter was placed. Attention was then turned abdominally.  The base of the umbilicus was elevated using Kocher clamps.  The skin was incised with a scalpel.  The fascia was grasped with Kochers and entered sharply using a scalpel.  Peritoneum was bluntly opened using a Ade clamp.  Intraperitoneal placement was confirmed via visualization of underlying bowel.     Judi trocar was then placed at the umbilical entry site. Diagnostic laparoscopy was performed, and revealed findings as noted above.  No areas of trauma or injury were noted immediately inferior to the umbilicus. Complete abdominal survey was performed. The patient was then placed in steep Trendelenburg positioning.    Ancillary trocars were then placed under direct visualization. Three 5mm trocars were placed, one in the LLQ,  and one in the RLQ.     Attention was turned to the pelvis. The right fallopian tube was elevated away from the underlying structures.  The mesosalpinx was clamped, sealed, and transected using the LigaSure device.  The fallopian tube was  to the level of the uterine cornua, transected, and removed from the abdomen.     Attention was then turned to the left Fallopian tube, which was removed in a similar fashion.     The pelvis was inspected and noted be adequately  hemostatic.        The umbilical fascia was then closed with 0 Vicryl suture.  The skin was closed with 4-0 Monocryl.        The Verma catheter was removed.      The patient was awakened from general anesthesia and taken the recovery room in stable condition.      I was present and scrubbed for the entirety of the surgical procedure.      Complications:  None; patient tolerated the procedure well.    Disposition: PACU - hemodynamically stable.  Condition: stable       Yanna Dumont  Phone Number: [phone]

## 2024-05-13 LAB
LABORATORY COMMENT REPORT: NORMAL
PATH REPORT.FINAL DX SPEC: NORMAL
PATH REPORT.GROSS SPEC: NORMAL
PATH REPORT.RELEVANT HX SPEC: NORMAL
PATH REPORT.TOTAL CANCER: NORMAL

## 2024-05-21 ENCOUNTER — APPOINTMENT (OUTPATIENT)
Dept: OBSTETRICS AND GYNECOLOGY | Facility: CLINIC | Age: 24
End: 2024-05-21
Payer: COMMERCIAL

## 2024-05-22 PROBLEM — R79.89 ELEVATED LFTS: Status: ACTIVE | Noted: 2024-02-16

## 2024-05-22 PROBLEM — R74.01 TRANSAMINITIS: Status: ACTIVE | Noted: 2024-02-15

## 2024-05-22 PROBLEM — R10.11 RUQ ABDOMINAL PAIN: Status: ACTIVE | Noted: 2024-02-19

## 2024-05-22 PROBLEM — K80.20 CHOLECYSTOLITHIASIS: Status: ACTIVE | Noted: 2024-02-16

## 2024-05-22 PROBLEM — Z90.49 S/P LAPAROSCOPIC CHOLECYSTECTOMY: Status: ACTIVE | Noted: 2024-02-19

## 2024-05-23 ENCOUNTER — OFFICE VISIT (OUTPATIENT)
Dept: CARDIOLOGY | Facility: HOSPITAL | Age: 24
End: 2024-05-23
Payer: COMMERCIAL

## 2024-05-23 VITALS
HEART RATE: 83 BPM | SYSTOLIC BLOOD PRESSURE: 117 MMHG | BODY MASS INDEX: 47.09 KG/M2 | WEIGHT: 293 LBS | DIASTOLIC BLOOD PRESSURE: 80 MMHG | HEIGHT: 66 IN | OXYGEN SATURATION: 98 %

## 2024-05-23 DIAGNOSIS — M25.474 BILATERAL SWELLING OF FEET AND ANKLES: ICD-10-CM

## 2024-05-23 DIAGNOSIS — I51.9 HEART DISEASE, UNSPECIFIED: Primary | ICD-10-CM

## 2024-05-23 DIAGNOSIS — M25.471 BILATERAL SWELLING OF FEET AND ANKLES: ICD-10-CM

## 2024-05-23 DIAGNOSIS — Z82.49 FAMILY HISTORY OF ISCHEMIC HEART DISEASE BEFORE AGE 50: ICD-10-CM

## 2024-05-23 DIAGNOSIS — R07.9 CHEST PAIN, UNSPECIFIED TYPE: ICD-10-CM

## 2024-05-23 DIAGNOSIS — M25.475 BILATERAL SWELLING OF FEET AND ANKLES: ICD-10-CM

## 2024-05-23 DIAGNOSIS — M25.472 BILATERAL SWELLING OF FEET AND ANKLES: ICD-10-CM

## 2024-05-23 PROCEDURE — 3008F BODY MASS INDEX DOCD: CPT | Performed by: INTERNAL MEDICINE

## 2024-05-23 PROCEDURE — 3074F SYST BP LT 130 MM HG: CPT | Performed by: INTERNAL MEDICINE

## 2024-05-23 PROCEDURE — 93005 ELECTROCARDIOGRAM TRACING: CPT | Performed by: INTERNAL MEDICINE

## 2024-05-23 PROCEDURE — 99203 OFFICE O/P NEW LOW 30 MIN: CPT | Performed by: INTERNAL MEDICINE

## 2024-05-23 PROCEDURE — 93010 ELECTROCARDIOGRAM REPORT: CPT | Performed by: INTERNAL MEDICINE

## 2024-05-23 PROCEDURE — 3079F DIAST BP 80-89 MM HG: CPT | Performed by: INTERNAL MEDICINE

## 2024-05-23 PROCEDURE — 99213 OFFICE O/P EST LOW 20 MIN: CPT | Performed by: INTERNAL MEDICINE

## 2024-05-23 PROCEDURE — 1036F TOBACCO NON-USER: CPT | Performed by: INTERNAL MEDICINE

## 2024-05-23 ASSESSMENT — PAIN SCALES - GENERAL: PAINLEVEL: 0-NO PAIN

## 2024-05-23 NOTE — PROGRESS NOTES
Subjective   Darcie Monroe is a 24 y.o. female.    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    HPI  The primary reason for referral is chest pain.  Patient was having upper abdominal/chest pain around December to February which led to multiple ER visits.  During one of the visit patient was referred to cardiology for assessment for chest pain.  However in the meantime patient got cholecystectomy procedure and since then she is not having any more chest pain.  Possibly for chest pain was related to cholecystectomy.  Patient is not very active and do get short of breath sometimes.  But no resting shortness of breath.  No shortness of breath with lying down.  Tendency for lower extremity edema when she stands for a long time.  Patient has some occasional palpitation when she wakes up in the morning which slowly disappears.  Otherwise no significant palpitation history where she gets dizzy or have presyncope like symptoms    Patient complains of orthostatic symptoms if she gets up too quickly.  No history of syncope or presyncope    ROS  No other significant complaints    Objective   Constitutional:       Appearance: Not in distress.   Pulmonary:      Effort: Pulmonary effort is normal.      Breath sounds: Normal breath sounds.   Cardiovascular:      PMI at left midclavicular line. Normal rate. Regular rhythm. Normal S1. Normal S2.       Murmurs: There is no murmur.   Edema:     Peripheral edema absent.   Abdominal:      General: Bowel sounds are normal.      Palpations: Abdomen is soft.   Skin:     General: Skin is warm and dry.   Neurological:      General: No  focal deficit present.      Mental Status: Alert and oriented to person, place and time.       EKG: Normal sinus rhythm    Lab Review:   Lab Results   Component Value Date     01/28/2024    K 3.9 01/28/2024     01/28/2024    CO2 22 01/28/2024    BUN 6 01/28/2024    CREATININE 0.60 01/28/2024    GLUCOSE 66 (L) 01/28/2024    CALCIUM 8.6 01/28/2024     Lab Results   Component Value Date    WBC 8.3 04/30/2024    HGB 10.6 (L) 04/30/2024    HCT 35.6 (L) 04/30/2024    MCV 79 (L) 04/30/2024     04/30/2024     Lab Results   Component Value Date    CHOL 133 03/23/2021    TRIG 135 03/23/2021    HDL 37.4 (A) 03/23/2021           TTE June 2018   1. Trivial anterior mitral valve leaflet prolpase and trivial insufficiency. Mildly thickened leaflets.   2. Normal sized aortic root and ascending aorta.   3. Normal left ventricular size and systolic function. Normal DTI.   4. Qualitatively normal right ventricular size and normal systolic function.    Assessment/Plan   The primary encounter diagnosis was Heart disease, unspecified. Diagnoses of Family history of ischemic heart disease before age 50, Bilateral swelling of feet and ankles, and BMI 50.0-59.9, adult (Multi) were also pertinent to this visit.    Patient is referred because of some chest pain which she is no longer have since cholecystectomy in February 2024.  Reports no other significant cardiovascular symptoms.  History of morbid obesity with a BMI of more than 50.     Chest pain: No longer complains of chest pain.  No further assessment necessary.  Was possibly related to cholecystitis    No other significant cardiovascular history  -Counseled on the importance of weight loss  -Counseled on the importance of regular exercise  -Counseled on the importance of quitting marijuana  -Counseling on the importance of healthy diet  -No follow-up necessary  -Can be referred back with symptoms

## 2024-05-30 LAB
ATRIAL RATE: 66 BPM
P AXIS: 29 DEGREES
P OFFSET: 208 MS
P ONSET: 149 MS
PR INTERVAL: 144 MS
Q ONSET: 221 MS
QRS COUNT: 11 BEATS
QRS DURATION: 86 MS
QT INTERVAL: 382 MS
QTC CALCULATION(BAZETT): 400 MS
QTC FREDERICIA: 394 MS
R AXIS: 24 DEGREES
T AXIS: 13 DEGREES
T OFFSET: 412 MS
VENTRICULAR RATE: 66 BPM

## 2024-07-11 NOTE — PROGRESS NOTES
Late entry due to difficulties with platform.  I saw and evaluated the patient. I personally obtained the key and critical portions of the history and physical exam or was physically present for key and critical portions performed by the resident/fellow. I reviewed the resident/fellow's documentation and discussed the patient with the resident/fellow. I agree with the resident/fellow's medical decision making as documented in the note.    Kadi Camargo MD

## 2024-08-03 ENCOUNTER — HOSPITAL ENCOUNTER (EMERGENCY)
Facility: HOSPITAL | Age: 24
Discharge: HOME | End: 2024-08-03
Payer: COMMERCIAL

## 2024-08-03 VITALS
HEART RATE: 90 BPM | DIASTOLIC BLOOD PRESSURE: 88 MMHG | TEMPERATURE: 95.6 F | BODY MASS INDEX: 47.09 KG/M2 | WEIGHT: 293 LBS | OXYGEN SATURATION: 98 % | SYSTOLIC BLOOD PRESSURE: 138 MMHG | RESPIRATION RATE: 18 BRPM | HEIGHT: 66 IN

## 2024-08-03 DIAGNOSIS — K52.9 GASTROENTERITIS: Primary | ICD-10-CM

## 2024-08-03 LAB
ALBUMIN SERPL BCP-MCNC: 4.4 G/DL (ref 3.4–5)
ALP SERPL-CCNC: 103 U/L (ref 33–110)
ALT SERPL W P-5'-P-CCNC: 7 U/L (ref 7–45)
ANION GAP SERPL CALC-SCNC: 16 MMOL/L (ref 10–20)
APPEARANCE UR: CLEAR
AST SERPL W P-5'-P-CCNC: 15 U/L (ref 9–39)
BASOPHILS # BLD AUTO: 0.07 X10*3/UL (ref 0–0.1)
BASOPHILS NFR BLD AUTO: 0.7 %
BILIRUB SERPL-MCNC: 0.5 MG/DL (ref 0–1.2)
BILIRUB UR STRIP.AUTO-MCNC: NEGATIVE MG/DL
BUN SERPL-MCNC: 10 MG/DL (ref 6–23)
CALCIUM SERPL-MCNC: 9.3 MG/DL (ref 8.6–10.6)
CHLORIDE SERPL-SCNC: 106 MMOL/L (ref 98–107)
CLUE CELLS SPEC QL WET PREP: NORMAL
CO2 SERPL-SCNC: 25 MMOL/L (ref 21–32)
COLOR UR: YELLOW
CREAT SERPL-MCNC: 0.72 MG/DL (ref 0.5–1.05)
EGFRCR SERPLBLD CKD-EPI 2021: >90 ML/MIN/1.73M*2
EOSINOPHIL # BLD AUTO: 0.05 X10*3/UL (ref 0–0.7)
EOSINOPHIL NFR BLD AUTO: 0.5 %
ERYTHROCYTE [DISTWIDTH] IN BLOOD BY AUTOMATED COUNT: 16.9 % (ref 11.5–14.5)
GLUCOSE SERPL-MCNC: 78 MG/DL (ref 74–99)
GLUCOSE UR STRIP.AUTO-MCNC: NORMAL MG/DL
HCT VFR BLD AUTO: 38.7 % (ref 36–46)
HGB BLD-MCNC: 11.9 G/DL (ref 12–16)
IMM GRANULOCYTES # BLD AUTO: 0.03 X10*3/UL (ref 0–0.7)
IMM GRANULOCYTES NFR BLD AUTO: 0.3 % (ref 0–0.9)
KETONES UR STRIP.AUTO-MCNC: NEGATIVE MG/DL
LEUKOCYTE ESTERASE UR QL STRIP.AUTO: NEGATIVE
LIPASE SERPL-CCNC: 8 U/L (ref 9–82)
LYMPHOCYTES # BLD AUTO: 2.15 X10*3/UL (ref 1.2–4.8)
LYMPHOCYTES NFR BLD AUTO: 21.6 %
MCH RBC QN AUTO: 23.7 PG (ref 26–34)
MCHC RBC AUTO-ENTMCNC: 30.7 G/DL (ref 32–36)
MCV RBC AUTO: 77 FL (ref 80–100)
MONOCYTES # BLD AUTO: 0.36 X10*3/UL (ref 0.1–1)
MONOCYTES NFR BLD AUTO: 3.6 %
MUCOUS THREADS #/AREA URNS AUTO: NORMAL /LPF
NEUTROPHILS # BLD AUTO: 7.3 X10*3/UL (ref 1.2–7.7)
NEUTROPHILS NFR BLD AUTO: 73.3 %
NITRITE UR QL STRIP.AUTO: NEGATIVE
NRBC BLD-RTO: 0 /100 WBCS (ref 0–0)
PH UR STRIP.AUTO: 6.5 [PH]
PLATELET # BLD AUTO: 388 X10*3/UL (ref 150–450)
POTASSIUM SERPL-SCNC: 3.6 MMOL/L (ref 3.5–5.3)
PREGNANCY TEST URINE, POC: NEGATIVE
PROT SERPL-MCNC: 7.9 G/DL (ref 6.4–8.2)
PROT UR STRIP.AUTO-MCNC: ABNORMAL MG/DL
RBC # BLD AUTO: 5.03 X10*6/UL (ref 4–5.2)
RBC # UR STRIP.AUTO: NEGATIVE /UL
RBC #/AREA URNS AUTO: NORMAL /HPF
SODIUM SERPL-SCNC: 143 MMOL/L (ref 136–145)
SP GR UR STRIP.AUTO: 1.03
SQUAMOUS #/AREA URNS AUTO: NORMAL /HPF
T VAGINALIS SPEC QL WET PREP: NORMAL
UROBILINOGEN UR STRIP.AUTO-MCNC: ABNORMAL MG/DL
WBC # BLD AUTO: 10 X10*3/UL (ref 4.4–11.3)
WBC #/AREA URNS AUTO: NORMAL /HPF
WBC VAG QL WET PREP: NORMAL
YEAST VAG QL WET PREP: NORMAL

## 2024-08-03 PROCEDURE — 87491 CHLMYD TRACH DNA AMP PROBE: CPT | Performed by: PHYSICIAN ASSISTANT

## 2024-08-03 PROCEDURE — 87210 SMEAR WET MOUNT SALINE/INK: CPT | Performed by: PHYSICIAN ASSISTANT

## 2024-08-03 PROCEDURE — 80053 COMPREHEN METABOLIC PANEL: CPT | Performed by: PHYSICIAN ASSISTANT

## 2024-08-03 PROCEDURE — 96375 TX/PRO/DX INJ NEW DRUG ADDON: CPT

## 2024-08-03 PROCEDURE — 81001 URINALYSIS AUTO W/SCOPE: CPT | Performed by: PHYSICIAN ASSISTANT

## 2024-08-03 PROCEDURE — 85025 COMPLETE CBC W/AUTO DIFF WBC: CPT | Performed by: PHYSICIAN ASSISTANT

## 2024-08-03 PROCEDURE — 99284 EMERGENCY DEPT VISIT MOD MDM: CPT | Performed by: PHYSICIAN ASSISTANT

## 2024-08-03 PROCEDURE — 36415 COLL VENOUS BLD VENIPUNCTURE: CPT | Performed by: PHYSICIAN ASSISTANT

## 2024-08-03 PROCEDURE — 96361 HYDRATE IV INFUSION ADD-ON: CPT

## 2024-08-03 PROCEDURE — 99284 EMERGENCY DEPT VISIT MOD MDM: CPT | Mod: 25

## 2024-08-03 PROCEDURE — 81025 URINE PREGNANCY TEST: CPT | Performed by: PHYSICIAN ASSISTANT

## 2024-08-03 PROCEDURE — 2500000004 HC RX 250 GENERAL PHARMACY W/ HCPCS (ALT 636 FOR OP/ED): Mod: SE | Performed by: PHYSICIAN ASSISTANT

## 2024-08-03 PROCEDURE — 83690 ASSAY OF LIPASE: CPT | Performed by: PHYSICIAN ASSISTANT

## 2024-08-03 PROCEDURE — 96374 THER/PROPH/DIAG INJ IV PUSH: CPT

## 2024-08-03 RX ORDER — DICYCLOMINE HYDROCHLORIDE 20 MG/1
10 TABLET ORAL 4 TIMES DAILY PRN
Qty: 21 TABLET | Refills: 0 | Status: SHIPPED | OUTPATIENT
Start: 2024-08-03

## 2024-08-03 RX ORDER — ONDANSETRON HYDROCHLORIDE 2 MG/ML
4 INJECTION, SOLUTION INTRAVENOUS ONCE
Status: COMPLETED | OUTPATIENT
Start: 2024-08-03 | End: 2024-08-03

## 2024-08-03 RX ORDER — FAMOTIDINE 20 MG/1
20 TABLET, FILM COATED ORAL 2 TIMES DAILY PRN
Qty: 30 TABLET | Refills: 0 | Status: SHIPPED | OUTPATIENT
Start: 2024-08-03

## 2024-08-03 RX ORDER — ONDANSETRON 4 MG/1
4 TABLET, FILM COATED ORAL EVERY 6 HOURS
Qty: 12 TABLET | Refills: 0 | Status: SHIPPED | OUTPATIENT
Start: 2024-08-03 | End: 2024-08-06

## 2024-08-03 RX ORDER — FAMOTIDINE 10 MG/ML
20 INJECTION INTRAVENOUS ONCE
Status: COMPLETED | OUTPATIENT
Start: 2024-08-03 | End: 2024-08-03

## 2024-08-03 RX ADMIN — SODIUM CHLORIDE, POTASSIUM CHLORIDE, SODIUM LACTATE AND CALCIUM CHLORIDE 1000 ML: 600; 310; 30; 20 INJECTION, SOLUTION INTRAVENOUS at 15:20

## 2024-08-03 RX ADMIN — FAMOTIDINE 20 MG: 10 INJECTION INTRAVENOUS at 15:20

## 2024-08-03 RX ADMIN — ONDANSETRON 4 MG: 2 INJECTION INTRAMUSCULAR; INTRAVENOUS at 15:20

## 2024-08-03 ASSESSMENT — PAIN - FUNCTIONAL ASSESSMENT: PAIN_FUNCTIONAL_ASSESSMENT: 0-10

## 2024-08-03 ASSESSMENT — COLUMBIA-SUICIDE SEVERITY RATING SCALE - C-SSRS
1. IN THE PAST MONTH, HAVE YOU WISHED YOU WERE DEAD OR WISHED YOU COULD GO TO SLEEP AND NOT WAKE UP?: NO
2. HAVE YOU ACTUALLY HAD ANY THOUGHTS OF KILLING YOURSELF?: NO
6. HAVE YOU EVER DONE ANYTHING, STARTED TO DO ANYTHING, OR PREPARED TO DO ANYTHING TO END YOUR LIFE?: NO

## 2024-08-03 ASSESSMENT — LIFESTYLE VARIABLES
HAVE YOU EVER FELT YOU SHOULD CUT DOWN ON YOUR DRINKING: NO
EVER FELT BAD OR GUILTY ABOUT YOUR DRINKING: NO
HAVE PEOPLE ANNOYED YOU BY CRITICIZING YOUR DRINKING: NO

## 2024-08-03 ASSESSMENT — PAIN SCALES - GENERAL: PAINLEVEL_OUTOF10: 10 - WORST POSSIBLE PAIN

## 2024-08-03 NOTE — ED PROVIDER NOTES
HPI   Chief Complaint   Patient presents with    Abdominal Pain    Vomiting    Nausea       This 24-year-old female with a history of cholecystectomy, obesity, PTSD, depression, ADHD who presents to the emergency department with concern for a 1 day history of nausea, vomiting as well as a few days of diarrhea.  Endorses some lower abdominal cramping that mostly she feels when she is vomiting.  States she had her tubes removed and hence denies pregnancy.  Last menstrual period was about 3 weeks ago.  She does report eating a lot of [employer] lately and suspects that that may be what is causing this.  She did reportedly drink 4 shots of tequila last night and smokes marijuana every other day or so, last time was 2 days ago.               Patient History   Past Medical History:   Diagnosis Date    Acanthosis nigricans 08/24/2017    Acanthosis nigricans    ADHD     Heart disease, unspecified 06/28/2018    Left ventricular dysfunction    History of pre-eclampsia     Hx of chlamydia infection     Hx of trichomoniasis     Migraine, unspecified, not intractable, without status migrainosus 11/19/2018    Headache, migraine    Morbid (severe) obesity due to excess calories (Multi) 01/14/2016    Morbid obesity    Other specified abnormal findings of blood chemistry 10/09/2018    Elevated TSH    Patellofemoral disorders, unspecified knee 11/14/2017    Patellofemoral syndrome    Personal history of other benign neoplasm 08/24/2017    History of lymphangioma    Personal history of other diseases of the musculoskeletal system and connective tissue 11/14/2017    History of genu valgum    Personal history of other diseases of the nervous system and sense organs 11/28/2018    History of Bell's palsy    Personal history of other endocrine, nutritional and metabolic disease 03/01/2016    History of insulin resistance    Personal history of transient ischemic attack (TIA), and cerebral infarction without residual deficits     History of  cerebrovascular accident    Vitamin D deficiency, unspecified 03/30/2021    Vitamin D deficiency     Past Surgical History:   Procedure Laterality Date    ABDOMINAL SURGERY  2017    for a mass    CHOLECYSTECTOMY      MR HEAD ANGIO W AND WO IV CONTRAST  11/10/2018    MR HEAD ANGIO W AND WO IV CONTRAST 11/10/2018 [facility]    MR NECK ANGIO WO IV CONTRAST  11/10/2018    MR NECK ANGIO WO IV CONTRAST 11/10/2018 [facility]    OTHER SURGICAL HISTORY  02/09/2015    Tonsillectomy Over Age 13     Family History   Problem Relation Name Age of Onset    Other (CARDIAC DEFIBRILLATOR) Mother      Cardiomyopathy Mother      Heart failure Mother      Obesity Mother          MORBID    Cardiomyopathy Mother's Brother      Heart failure Mother's Brother      Cardiomyopathy Maternal Grandmother      Heart failure Maternal Grandmother      Cardiomyopathy Maternal Grandfather      Heart failure Maternal Grandfather      Other (CARDIAC DEFIBRILLATOR) Other MATERNAL RELATIVES     Cardiomyopathy Other MATERNAL RELATIVES     Heart failure Other MATERNAL RELATIVES     Other (LEFT VENTRICULAR ASSIST DEVICE) Other MATERNAL RELATIVES     Arthritis Other OTHER     Asthma Other OTHER     Heart failure Other OTHER     Hypertension Other OTHER     Sleep apnea Other OTHER      Social History     Tobacco Use    Smoking status: Never    Smokeless tobacco: Never   Vaping Use    Vaping status: Never Used   Substance Use Topics    Alcohol use: Never     Comment: occassional    Drug use: Not Currently     Types: Marijuana     Comment: rarely       Physical Exam   ED Triage Vitals [08/03/24 1337]   Temperature Heart Rate Respirations BP   35.3 °C (95.6 °F) 90 18 138/88      Pulse Ox Temp Source Heart Rate Source Patient Position   98 % Temporal -- --      BP Location FiO2 (%)     -- --       Physical Exam      ED Course & MDM   Diagnoses as of 08/06/24 0809   Gastroenteritis                       Adiel Coma Scale Score: 15                         Medical Decision Making  24-year-old female, is alert and oriented x 3, afebrile and hemodynamically stable presenting to the emergency department with concern for diffuse lower abdominal crampy pain along with nausea, vomiting, diarrhea in setting of recent alcohol intake last night.    The patient is in no apparent distress on encounter.  She is holding an emesis bag, though is not actively vomiting.    On exam, her abdomen is soft and nontender across all 4 quadrants.  She did want to get tested for STDs.  The patient elected to collect vaginal sample through a self swab method.  Laboratory workup was performed including CBC, CMP, lipase which was grossly unremarkable.  Urinalysis with no infectious concerns.    The patient was given Pepcid, Zofran and a liter of LR with resolution of symptoms.  I have low concerns for acute surgical causes and do not believe additional workup is warranted at this time including imaging.  The patient was able to tolerate oral intake.  She likely has gastroenteritis versus hangover symptoms. She was discharged home with instructions to follow-up with her PCP.         Procedure  Procedures     Dung Lai PA-C  08/06/24 1740

## 2024-08-04 LAB
C TRACH RRNA SPEC QL NAA+PROBE: NEGATIVE
N GONORRHOEA DNA SPEC QL PROBE+SIG AMP: NEGATIVE

## 2024-11-21 ENCOUNTER — HOSPITAL ENCOUNTER (EMERGENCY)
Facility: HOSPITAL | Age: 24
Discharge: HOME | End: 2024-11-22
Attending: STUDENT IN AN ORGANIZED HEALTH CARE EDUCATION/TRAINING PROGRAM
Payer: COMMERCIAL

## 2024-11-21 DIAGNOSIS — M54.6 ACUTE BILATERAL THORACIC BACK PAIN: Primary | ICD-10-CM

## 2024-11-21 PROCEDURE — 99283 EMERGENCY DEPT VISIT LOW MDM: CPT

## 2024-11-21 PROCEDURE — 99284 EMERGENCY DEPT VISIT MOD MDM: CPT | Performed by: STUDENT IN AN ORGANIZED HEALTH CARE EDUCATION/TRAINING PROGRAM

## 2024-11-21 RX ORDER — LIDOCAINE 50 MG/G
1 PATCH TOPICAL DAILY
Qty: 5 PATCH | Refills: 0 | Status: SHIPPED | OUTPATIENT
Start: 2024-11-21

## 2024-11-21 ASSESSMENT — PAIN DESCRIPTION - LOCATION: LOCATION: BACK

## 2024-11-21 ASSESSMENT — LIFESTYLE VARIABLES
EVER HAD A DRINK FIRST THING IN THE MORNING TO STEADY YOUR NERVES TO GET RID OF A HANGOVER: NO
TOTAL SCORE: 0
HAVE PEOPLE ANNOYED YOU BY CRITICIZING YOUR DRINKING: NO
HAVE YOU EVER FELT YOU SHOULD CUT DOWN ON YOUR DRINKING: NO
EVER FELT BAD OR GUILTY ABOUT YOUR DRINKING: NO

## 2024-11-21 ASSESSMENT — PAIN - FUNCTIONAL ASSESSMENT: PAIN_FUNCTIONAL_ASSESSMENT: 0-10

## 2024-11-21 ASSESSMENT — PAIN DESCRIPTION - PAIN TYPE: TYPE: ACUTE PAIN

## 2024-11-21 ASSESSMENT — PAIN DESCRIPTION - PROGRESSION: CLINICAL_PROGRESSION: NOT CHANGED

## 2024-11-21 ASSESSMENT — PAIN DESCRIPTION - FREQUENCY: FREQUENCY: CONSTANT/CONTINUOUS

## 2024-11-21 ASSESSMENT — PAIN DESCRIPTION - ORIENTATION: ORIENTATION: MID

## 2024-11-21 ASSESSMENT — PAIN DESCRIPTION - ONSET: ONSET: SUDDEN

## 2024-11-21 ASSESSMENT — PAIN DESCRIPTION - DESCRIPTORS: DESCRIPTORS: SHARP

## 2024-11-21 ASSESSMENT — PAIN SCALES - GENERAL: PAINLEVEL_OUTOF10: 10 - WORST POSSIBLE PAIN

## 2024-11-22 VITALS
HEART RATE: 80 BPM | TEMPERATURE: 97.3 F | RESPIRATION RATE: 16 BRPM | OXYGEN SATURATION: 100 % | SYSTOLIC BLOOD PRESSURE: 118 MMHG | WEIGHT: 293 LBS | HEIGHT: 66 IN | DIASTOLIC BLOOD PRESSURE: 75 MMHG | BODY MASS INDEX: 47.09 KG/M2

## 2024-11-22 LAB — HCG UR QL IA.RAPID: NEGATIVE

## 2024-11-22 PROCEDURE — 2500000004 HC RX 250 GENERAL PHARMACY W/ HCPCS (ALT 636 FOR OP/ED)

## 2024-11-22 PROCEDURE — 82075 ASSAY OF BREATH ETHANOL: CPT

## 2024-11-22 PROCEDURE — 2500000005 HC RX 250 GENERAL PHARMACY W/O HCPCS: Performed by: STUDENT IN AN ORGANIZED HEALTH CARE EDUCATION/TRAINING PROGRAM

## 2024-11-22 PROCEDURE — 96372 THER/PROPH/DIAG INJ SC/IM: CPT

## 2024-11-22 PROCEDURE — 81025 URINE PREGNANCY TEST: CPT | Performed by: STUDENT IN AN ORGANIZED HEALTH CARE EDUCATION/TRAINING PROGRAM

## 2024-11-22 RX ORDER — LIDOCAINE 560 MG/1
1 PATCH PERCUTANEOUS; TOPICAL; TRANSDERMAL ONCE
Status: DISCONTINUED | OUTPATIENT
Start: 2024-11-22 | End: 2024-11-22 | Stop reason: HOSPADM

## 2024-11-22 RX ORDER — METHOCARBAMOL 500 MG/1
500 TABLET, FILM COATED ORAL EVERY 12 HOURS PRN
Qty: 10 TABLET | Refills: 0 | Status: SHIPPED | OUTPATIENT
Start: 2024-11-22

## 2024-11-22 RX ORDER — METHOCARBAMOL 500 MG/1
750 TABLET, FILM COATED ORAL ONCE
Status: DISCONTINUED | OUTPATIENT
Start: 2024-11-22 | End: 2024-11-22 | Stop reason: HOSPADM

## 2024-11-22 RX ORDER — NAPROXEN 500 MG/1
500 TABLET ORAL 2 TIMES DAILY PRN
Qty: 20 TABLET | Refills: 0 | Status: SHIPPED | OUTPATIENT
Start: 2024-11-22

## 2024-11-22 RX ORDER — KETOROLAC TROMETHAMINE 30 MG/ML
30 INJECTION, SOLUTION INTRAMUSCULAR; INTRAVENOUS ONCE
Status: COMPLETED | OUTPATIENT
Start: 2024-11-22 | End: 2024-11-22

## 2024-11-22 RX ADMIN — LIDOCAINE 1 PATCH: 4 PATCH TOPICAL at 01:23

## 2024-11-22 RX ADMIN — KETOROLAC TROMETHAMINE 30 MG: 30 INJECTION, SOLUTION INTRAMUSCULAR at 01:23

## 2024-11-22 NOTE — ED PROVIDER NOTES
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 Pt states pain is sharp and radiates up to both shoulders         HISTORY OF PRESENT ILLNESS    24-year-old female comes emergency room with back pain, she was at work, trying to transfer the patient, moved to stop the patient from falling, felt a sharp pain in the middle of her thoracic part of her back, rating down and up to the shoulder blades.  Having pain ever since, this happened around 830.  Did not fall, hit her head, has no other injuries.  No other symptoms today.  Not having any dribbling of urine, saddle anesthesia.  No history of IV drug use, chronic steroid use.      History provided by:  Patient      Nursing Notes were reviewed.    PAST MEDICAL HISTORY     Past Medical History:   Diagnosis Date    Acanthosis nigricans 08/24/2017    Acanthosis nigricans    ADHD     Heart disease, unspecified 06/28/2018    Left ventricular dysfunction    History of pre-eclampsia     Hx of chlamydia infection     Hx of trichomoniasis     Migraine, unspecified, not intractable, without status migrainosus 11/19/2018    Headache, migraine    Morbid (severe) obesity due to excess calories (Multi) 01/14/2016    Morbid obesity    Other specified abnormal findings of blood chemistry 10/09/2018    Elevated TSH    Patellofemoral disorders, unspecified knee 11/14/2017    Patellofemoral syndrome    Personal history of other benign neoplasm 08/24/2017    History of lymphangioma    Personal history of other diseases of the musculoskeletal system and connective tissue 11/14/2017    History of genu valgum    Personal history of other diseases of the nervous system and sense organs 11/28/2018    History " of Bell's palsy    Personal history of other endocrine, nutritional and metabolic disease 03/01/2016    History of insulin resistance    Personal history of transient ischemic attack (TIA), and cerebral infarction without residual deficits     History of cerebrovascular accident    Vitamin D deficiency, unspecified 03/30/2021    Vitamin D deficiency         SURGICAL HISTORY       Past Surgical History:   Procedure Laterality Date    ABDOMINAL SURGERY  2017    for a mass    CHOLECYSTECTOMY      MR HEAD ANGIO W AND WO IV CONTRAST  11/10/2018    MR HEAD ANGIO W AND WO IV CONTRAST 11/10/2018 [facility]    MR NECK ANGIO WO IV CONTRAST  11/10/2018    MR NECK ANGIO WO IV CONTRAST 11/10/2018 [facility]    OTHER SURGICAL HISTORY  02/09/2015    Tonsillectomy Over Age 13         CURRENT MEDICATIONS       Previous Medications    ACETAMINOPHEN (TYLENOL EXTRA STRENGTH) 500 MG TABLET    Take 2 tablets (1,000 mg) by mouth every 6 hours if needed for mild pain (1 - 3).    DICYCLOMINE (BENTYL) 20 MG TABLET    Take 0.5 tablets (10 mg) by mouth 4 times a day as needed (abdominal pain/cramping).    FAMOTIDINE (PEPCID) 20 MG TABLET    Take 1 tablet (20 mg) by mouth 2 times a day as needed (heartburn/reflux).    FERROUS SULFATE, 325 MG FERROUS SULFATE, TABLET    Take 1 tablet by mouth 2 times a day.    IBUPROFEN 600 MG TABLET    Take 1 tablet (600 mg) by mouth every 6 hours if needed for mild pain (1 - 3).    NALOXONE (NARCAN) 4 MG/0.1 ML NASAL SPRAY    Administer 1 spray (4 mg) into affected nostril(s) if needed for respiratory depression or opioid reversal for up to 2 doses. May repeat every 2-3 minutes if needed, alternating nostrils, until medical assistance becomes available.    OXYCODONE (ROXICODONE) 5 MG IMMEDIATE RELEASE TABLET    Take 1 tablet (5 mg) by mouth every 6 hours if needed for moderate pain (4 - 6) or severe pain (7 - 10).    POLYETHYLENE GLYCOL (GLYCOLAX, MIRALAX) 17 GRAM PACKET    Take 17 g by  mouth once daily.       ALLERGIES     Patient has no known allergies.    FAMILY HISTORY       Family History   Problem Relation Name Age of Onset    Other (CARDIAC DEFIBRILLATOR) Mother      Cardiomyopathy Mother      Heart failure Mother      Obesity Mother          MORBID    Cardiomyopathy Mother's Brother      Heart failure Mother's Brother      Cardiomyopathy Maternal Grandmother      Heart failure Maternal Grandmother      Cardiomyopathy Maternal Grandfather      Heart failure Maternal Grandfather      Other (CARDIAC DEFIBRILLATOR) Other MATERNAL RELATIVES     Cardiomyopathy Other MATERNAL RELATIVES     Heart failure Other MATERNAL RELATIVES     Other (LEFT VENTRICULAR ASSIST DEVICE) Other MATERNAL RELATIVES     Arthritis Other OTHER     Asthma Other OTHER     Heart failure Other OTHER     Hypertension Other OTHER     Sleep apnea Other OTHER           SOCIAL HISTORY       Social History     Socioeconomic History    Marital status: Single    Highest education level: High school graduate   Occupational History    Occupation: [employer]IDE   Tobacco Use    Smoking status: Never    Smokeless tobacco: Never   Vaping Use    Vaping status: Never Used   Substance and Sexual Activity    Alcohol use: Never     Comment: occassional    Drug use: Not Currently     Types: Marijuana     Comment: rarely    Sexual activity: Yes     Social Drivers of Health     Financial Resource Strain: Low Risk  (2/19/2024)    Received from [facility]    Overall Financial Resource Strain (CARDIA)     Difficulty of Paying Living Expenses: Not very hard   Food Insecurity: No Food Insecurity (2/19/2024)    Received from [facility]    Hunger Vital Sign     Worried About Running Out of Food in the Last Year: Never true     Ran Out of Food in the Last Year: Never true   Transportation Needs: No Transportation Needs (2/19/2024)    Received from [facility]    PRAPARE -  Transportation     Lack of Transportation (Medical): No     Lack of Transportation (Non-Medical): No       SCREENINGS                        PHYSICAL EXAM    (up to 7 for level 4, 8 or more for level 5)     ED Triage Vitals [11/21/24 2330]   Temperature Heart Rate Respirations BP   36.3 °C (97.3 °F) 77 18 147/80      Pulse Ox Temp Source Heart Rate Source Patient Position   100 % Temporal Monitor Sitting      BP Location FiO2 (%)     Right arm --       Physical Exam  Vitals and nursing note reviewed.   Constitutional:       Appearance: Normal appearance.   HENT:      Head: Normocephalic and atraumatic.   Eyes:      General: No scleral icterus.        Right eye: No discharge.         Left eye: No discharge.      Conjunctiva/sclera: Conjunctivae normal.   Cardiovascular:      Rate and Rhythm: Normal rate and regular rhythm.      Pulses: Normal pulses.   Pulmonary:      Effort: Pulmonary effort is normal. No respiratory distress.   Abdominal:      General: There is no distension.   Musculoskeletal:      Cervical back: Normal range of motion.      Comments: She is tender in the paraspinal region bilaterally in her thoracic spine and upper lumbar spine.  No midline tenderness, no deformities or step-offs.   Skin:     General: Skin is warm and dry.   Neurological:      Mental Status: She is alert. Mental status is at baseline.      Comments: Patient has 5 out of 5 strength in bilateral lower extremities.  Patient is able to bear weight and ambulate.   Psychiatric:         Mood and Affect: Mood normal.         Behavior: Behavior normal.          DIAGNOSTIC RESULTS     LABS:  Labs Reviewed   HCG, URINE, QUALITATIVE - Normal       Result Value    HCG, Urine NEGATIVE         All other labs were within normal range or not returned as of this dictation.    Imaging  No orders to display        Procedures  Procedures     EMERGENCY DEPARTMENT COURSE/MDM:     Diagnoses as of 11/22/24 0141   Acute bilateral thoracic back pain         Medical Decision Making  24-year-old female comes emergency room with a back injury at work, has some paraspinal tenderness bilaterally in her thoracic spine on exam.  No midline tenderness, deformities or step-offs, she is able to bear weight, ambulate, unremarkable lower extremity neuroexam.  I do not believe any acute spinal cord pathology is going on here today.  She does not have pain radiating down her legs, I do not have high clinical suspicion for a herniated disc.  Pregnancy test negative here, she was given a dose of Toradol, lidocaine patch here.  Discharged with a prescription for naproxen, lidocaine, Robaxin, will follow-up with her PCP, return for any new, concerning or worsening symptoms.        Patient and or family in agreement and understanding of treatment plan.  All questions answered.      I reviewed the case with the attending ED physician. The attending ED physician agrees with the plan. Patient and/or patient´s representative was counseled regarding labs, imaging, likely diagnosis, and plan. All questions were answered.    ED Medications administered this visit:    Medications   lidocaine 4 % patch 1 patch (1 patch transdermal Medication Applied 11/22/24 0123)   methocarbamol (Robaxin) tablet 750 mg (750 mg oral Not Given 11/22/24 0115)   ketorolac (Toradol) injection 30 mg (30 mg intramuscular Given 11/22/24 0123)       New Prescriptions from this visit:    New Prescriptions    LIDOCAINE (LIDODERM) 5 % PATCH    Place 1 patch over 12 hours on the skin once daily. Remove & discard patch within 12 hours or as directed by MD.    METHOCARBAMOL (ROBAXIN) 500 MG TABLET    Take 1 tablet (500 mg) by mouth every 12 hours if needed for muscle spasms for up to 10 doses.    NAPROXEN (NAPROSYN) 500 MG TABLET    Take 1 tablet (500 mg) by mouth 2 times a day as needed (pain).       [place]  [id number]  [facility], [address]  [facility]  [address]  [phone]    Schedule an appointment as soon as possible for a visit in 2 days          Final Impression:   1. Acute bilateral thoracic back pain          (Please note that portions of this note were completed with a voice recognition program.  Efforts were made to edit the dictations but occasionally words are mis-transcribed.)     Ruddy Quinn DO  Resident  11/22/24 0141

## 2025-02-13 ENCOUNTER — TELEPHONE (OUTPATIENT)
Dept: GENETICS | Facility: CLINIC | Age: 25
End: 2025-02-13
Payer: COMMERCIAL

## 2025-02-13 DIAGNOSIS — Z82.49 FAMILY HISTORY OF CARDIOMYOPATHY: Primary | ICD-10-CM

## 2025-02-13 DIAGNOSIS — R00.2 PALPITATIONS: ICD-10-CM

## 2025-02-13 NOTE — TELEPHONE ENCOUNTER
Phone call to Darcie Monroe to follow up on genetic testing.  I previously spoke with her during her sister's Cardiogenetics appointment to ask about genetic testing she might have had done because of her mother's history of cardiomyopathy.  She gave verbal permission to review her records.    The only genetic testing results in our system were those from prenatal screening during her pregnancy.  There were no results from testing of genes related to cardiomyopathy.    She previously had an echocardiogram several years ago.  Ongoing imaging/ assessment of cardiac function is recommended, given her mother's history (diagnosed with heart failure in her early 30's).  I will place a referral to Cardiology for evaluation and determination of appropriate studies.  She does report that she occasionally feels her heart skipping a beat.    Shauna Dobbs MD, PhD  , Genetics

## 2025-02-24 ENCOUNTER — APPOINTMENT (OUTPATIENT)
Dept: PRIMARY CARE | Facility: HOSPITAL | Age: 25
End: 2025-02-24
Payer: COMMERCIAL

## 2025-04-29 ENCOUNTER — APPOINTMENT (OUTPATIENT)
Dept: CARDIOLOGY | Facility: CLINIC | Age: 25
End: 2025-04-29
Payer: COMMERCIAL

## 2025-06-09 ENCOUNTER — TELEPHONE (OUTPATIENT)
Dept: CARDIOLOGY | Facility: HOSPITAL | Age: 25
End: 2025-06-09
Payer: COMMERCIAL

## 2025-06-12 ENCOUNTER — OFFICE VISIT (OUTPATIENT)
Dept: CARDIOLOGY | Facility: HOSPITAL | Age: 25
End: 2025-06-12
Payer: COMMERCIAL

## 2025-06-12 VITALS
SYSTOLIC BLOOD PRESSURE: 130 MMHG | WEIGHT: 269 LBS | HEIGHT: 66 IN | HEART RATE: 77 BPM | OXYGEN SATURATION: 99 % | DIASTOLIC BLOOD PRESSURE: 84 MMHG | BODY MASS INDEX: 43.23 KG/M2

## 2025-06-12 DIAGNOSIS — Z82.49 FAMILY HISTORY OF CARDIOMYOPATHY: ICD-10-CM

## 2025-06-12 DIAGNOSIS — K52.9 GASTROENTERITIS: ICD-10-CM

## 2025-06-12 DIAGNOSIS — R07.9 CHEST PAIN, UNSPECIFIED TYPE: Primary | ICD-10-CM

## 2025-06-12 DIAGNOSIS — M54.6 ACUTE BILATERAL THORACIC BACK PAIN: ICD-10-CM

## 2025-06-12 DIAGNOSIS — R00.2 PALPITATIONS: ICD-10-CM

## 2025-06-12 PROCEDURE — 3079F DIAST BP 80-89 MM HG: CPT

## 2025-06-12 PROCEDURE — 93005 ELECTROCARDIOGRAM TRACING: CPT

## 2025-06-12 PROCEDURE — 3075F SYST BP GE 130 - 139MM HG: CPT

## 2025-06-12 PROCEDURE — 3008F BODY MASS INDEX DOCD: CPT

## 2025-06-12 PROCEDURE — 99202 OFFICE O/P NEW SF 15 MIN: CPT

## 2025-06-12 PROCEDURE — 99214 OFFICE O/P EST MOD 30 MIN: CPT

## 2025-06-12 RX ORDER — METHOCARBAMOL 500 MG/1
500 TABLET, FILM COATED ORAL EVERY 12 HOURS PRN
Qty: 10 TABLET | Refills: 0 | Status: SHIPPED | OUTPATIENT
Start: 2025-06-12

## 2025-06-12 RX ORDER — FAMOTIDINE 20 MG/1
20 TABLET, FILM COATED ORAL 2 TIMES DAILY PRN
Qty: 30 TABLET | Refills: 0 | Status: SHIPPED | OUTPATIENT
Start: 2025-06-12

## 2025-06-12 NOTE — PROGRESS NOTES
Primary Care Physician: No primary care provider on file.  Patient's Location: [facility] [address]    Date of Visit: 2025  3:00 PM EDT  Location of visit: [facility]   Type of Visit:   Last visit: Visit date not found    HPI / Summary:   Darcie Monroe is a very pleasant 25 y.o. female from [place], [place].   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  She is accompanied by: her two kids.    Darcie Monroe was referred to the Advanced Heart Failure outpatient clinic by Genetics Dr. Dobbs regarding her extensive family heart history, her mom was diagnosed with CHF in the early 30's, ICD and  at the age of 41. Another family member had a LVAD. Other members were diagnosed with cardiomyopathy. She does report that she occasionally feels her heart skipping a beat. She last saw cardiologist Dr. Saravia 2024 for chest pain that she no longer had since cholecystectomy in 2024.     Today:     Endorses left side CP (not today), under the armpit and radiating towards the back. Started few months ago, similar to her chest pain one year ago. No associated N/V. She does get sweaty and SOB 2/2 to worrying. She will lay down and it will go away. It can last over 30 mins, rated a 10/10 and described as sharp pain. She has been very stressed and overwhelmed lately. Taking deep breath helps and if she presses on the area of the pain, it increases the pain. Denies palpitations, increase heart rate or skipped beat. Denies MARTINEZ, difficulty ambulating long distances or flight of stairs. Denies SOB while changing, showering or bendopnea. Denies orthopnea/PND, cough, and sleeps on her side in bed with 1 pillow. Endorses lower leg edema at times, it started in 2017. Denies abdominal tightness, or bloated. Endorses loss of appetite. She lost 40 lbs since last November. Endorses lightheadedness if she gets up to fast. Denies dizziness, syncope, or recent falls. Medication adherent. Eating/drinking well. Denies NVD. Does  "not have a home B/P monitoring. Home scale and weight stable at 269 lbs.    Hospitalizations: Patient denies    Objective   Medical History (non cardiac):   # ADHD  # PTSD  # Depression  # pre eclampsia  # TIA and CVA w/o residual deficits  # Hypertension: Last BP: 130/84.  # Hyperlipidemia: Last Tchol 3/23/2021: 133 / LDL or  / HDL / TRIG   # Obesity: Last BMI: 43.44.    Cardiovascular History ,     Surgical Hx:   # Cholecystectomy Feb 2024  # Tubal ligation in May 2024  # Biliary sphincterectomy with placement of 1 plastic stent into CBD     Social Hx:  # smokes marijuana 3-4 times a week  # Denies smoking cig, ETOH    Family Hx:   # other Family member: Arthritis, Asthma, ICD, HTN, LVAD  # Mother: ICD, cardiomyopathy, CHF  # Maternal grandfather: cardiomyopathy, CHF  # Maternal grandmother: cardiomyopathy, CHF  # Mother's brother: cardiomyopathy, CHF      Allergies:   RX Allergies[1]    Exam:       6/12/2025     3:27 PM 11/22/2024     2:15 AM 11/21/2024    11:30 PM 8/3/2024     1:37 PM 5/23/2024     2:20 PM 5/6/2024     5:33 PM   Vitals   Systolic 130 118 147 138 117 134   Diastolic 84 75 80 88 80 73   BP Location Left arm Right arm Right arm  Left arm    Heart Rate 77 80 77 90 83 80   Temp   36.3 °C (97.3 °F) 35.3 °C (95.6 °F)  36.1 °C (97 °F)   Resp  16 18 18  15   Height 1.676 m (5' 6\")  1.676 m (5' 6\") 1.676 m (5' 6\") 1.676 m (5' 6\")    Weight (lb) 269  310 317 315.6    BMI 43.42 kg/m2  50.04 kg/m2 51.17 kg/m2 50.94 kg/m2    BSA (m2) 2.38 m2  2.56 m2 2.59 m2 2.58 m2    Visit Report Report    Report      Wt Readings from Last 5 Encounters:   06/12/25 122 kg (269 lb)   11/21/24 141 kg (310 lb)   08/03/24 144 kg (317 lb)   05/23/24 143 kg (315 lb 9.6 oz)   05/06/24 148 kg (325 lb 8 oz)     GEN: Pleasant, well-appearing, no acute distress,   HEENT: JVP not elevated, no icterus. Moist mucus membranes, No HJR, no thyromegaly  CHEST: No wheeze, good air movement bilaterally.  CV: Normal rate, regular rhythm. Normal " "S1, S2, NO S3/4, no m/r/g  ABD: Soft, ND, NT and normal active bowel sounds  EXT: Warm, well perfused, No LE edema, distal pulses are 2+ in upper extremities  SKIN: Dry with no rash present  NEURO: Oriented and alert x3, mood and affect appropriate and is oriented to plan    Labs:   CMP:  Recent Labs     08/03/24  1510 01/28/24  0833 12/29/23  2051 08/31/23  1713 03/23/21  1014 04/28/20  1245 04/28/20  0352    136 139 139 137   < >  --    K 3.6 3.9 4.2 4.3 4.2   < >  --     106 108* 105 107   < >  --    CO2 25 22 20* 24 23   < >  --    ANIONGAP 16 12 15 14 11   < >  --    BUN 10 6 6 8 10   < >  --    CREATININE 0.72 0.60 0.51 0.57 0.76   < > 0.79   EGFR >90 >90 >90  --   --   --   --    MG  --   --   --   --   --   --  5.65*    < > = values in this interval not displayed.     Recent Labs     08/03/24  1510 01/28/24  0833 12/29/23  2051   ALBUMIN 4.4 3.1* 3.4   ALKPHOS 103 168* 154*   ALT 7 16 11   AST 15 19 12   BILITOT 0.5 0.5 0.3   LIPASE 8*  --   --    CBC:  Recent Labs     08/03/24  1510 04/30/24  1329 01/26/24  1814 12/29/23  2158 12/05/23  1708   WBC 10.0 8.3 11.9* 12.8* 10.3   HGB 11.9* 10.6* 9.9* 9.9* 10.1*   HCT 38.7 35.6* 31.9* 32.6* 32.4*    320 324 305 325   MCV 77* 79* 77* 79* 81   HEME/ENDO:  Recent Labs     08/31/23  1713 11/16/21  1607 03/23/21  1014 09/23/19  1505 06/25/18  1121   TSH  --   --  2.18 1.57 4.19*   FREET4  --   --   --  1.21 1.33   HGBA1C 5.3 5.4 5.6 5.4 5.3    CARDIAC:   Recent Labs     05/18/20  1341 04/27/20  1246 03/04/20  1555 06/01/18  0108   LDH  --  206 184  --    BNP 8  --  8 <2     Recent Labs     03/23/21  1014   CHOL 133   LDLF 69   HDL 37.4*   TRIG 135   MICRO: No results for input(s): \"ESR\", \"CRP\", \"PROCAL\" in the last 53690 hours.No results found for the last 90 days.      Notable Studies, reviewed:   EKG:   Recent Labs     05/23/24  1408 02/12/24  2215 05/18/20  1241   VENTRATE 66 76 91   ATRRATE 66 76 91   QTCFRED 394 411 423   QRSDUR 86 74 76 " "  QTCCALCB 400 996 982     TTE June 2018   1. Trivial anterior mitral valve leaflet prolpase and trivial insufficiency. Mildly thickened leaflets.   2. Normal sized aortic root and ascending aorta.   3. Normal left ventricular size and systolic function. Normal DTI.   4. Qualitatively normal right ventricular size and normal systolic function.     TTE August 2017   1. Normal left ventricular size and systolic function. Average LV global strain -14.4%. 3D EF ~ 55%. Normal Doppler tissue imaging.   2. Mildly thickened anterior mitral valve leaflet tip and trivial insufficiency.   3. No aortic valve regurgitation.   4. Qualitatively normal right ventricular size and normal systolic function.   5. No pericardial effusion.    TTE June 2017   1. Normal left ventricular size and low normal systolic function. EF is 54%. Mildly decreased septal e' at 0.09.   2. No mitral valve regurgitation.   3. No aortic valve regurgitation.   4. Qualitatively normal right ventricular size and normal systolic function.    cMRI June 2017  IMPRESSION:  1. The left ventricle is normal in size, shape, and has borderline  reduced  global systolic function with left ventricular ejection  fraction of  There are no segmental wall motion abnormalities.  Quantitative values are as noted above.  2. There are no findings to suggest prior ischemic damage or an  infiltrative process.  3. Normal aortic, mitral, and tricuspid valve function.  4.  Limited evaluation of the coronary arteries due to motion. Within  the limitations, the origin of the coronary arteries appear normal.    EKG 5/2024  Normal sinus rhythm with sinus arrhythmia  Normal ECG  When compared with ECG of 12-FEB-2024 22:10,  No significant change was found  Confirmed by Dana Saravia ([id number]) on 5/30/2024 1:49:29 PM    No results found for this or any previous visit (from the past 4464 hours).  Echocardiogram: No results for input(s): \"EF\", \"LVIDD\", \"IVS\", \"RV\", \"RVFRWALLPKSP\", \"TAPSE\" " in the last 31896 hours.No results found for this or any previous visit from the past 1800 days.    Coronary Angiography: No results found for this or any previous visit from the past 1800 days.    Right Heart Cath: No results found for this or any previous visit from the past 1800 days.    Cardiac Scoring: No results found for this or any previous visit from the past 1800 days.    Cardiac MRI: No results found for this or any previous visit from the past 1800 days.    Nuclear:No results found for this or any previous visit from the past 1800 days.    Metabolic Stress: No results found for this or any previous visit from the past 1800 days.      Current Outpatient Medications   Medication Instructions    famotidine (PEPCID) 20 mg, oral, 2 times daily PRN    lidocaine (Lidoderm) 5 % patch 1 patch, transdermal, Daily, Remove & discard patch within 12 hours or as directed by MD.    methocarbamol (ROBAXIN) 500 mg, oral, Every 12 hours PRN    polyethylene glycol (GLYCOLAX, MIRALAX) 17 g, oral, Daily      Notable Therapies: *GDMT*   Class  Agent SAFETY    *ARNI* / ACE / ARB   Last BP: 130/84, Last BUN/Cr (GFR): 8/3/2024: 10/0.72 (>90)    *Beta-Blocker*   Last HR: 77    *MRA*   Last K: 8/3/2024: 3.6     *SGLT2*   Last A1c 8/31/2023: 5.3     Diuretic   Last BNP: 5/18/2020: 8    Hydralazine/ISDN       Digoxin  Last Digoxin level: No results found for requested labs within last 3650 days.    Anticoagulation   Last Hgb: 8/3/2024: 11.9    Anti-arrhythmic   Last QTc: 5/23/2024: 400    Antiplatelet   Last Platelet: 8/3/2024: 388    Lipid-Lowering   Last Tchol 3/23/2021: 133 / LDL 3/23/2021: 69 or No results found for requested labs within last 3650 days. / HDL 3/23/2021: 37.4 / TRIG 3/23/2021: 135    Other        Device(s)   EF: No results found for requested labs within last 3650 days.%, QRS: 5/23/2024: 86ms    Cardiac Rehab,   if EF <35/MI/OHS        HFpEF score table(mmdhfpefscore)    IMPRESSION/PLAN:    Darcie Monroe is a  25 y.o. female  AA referred to the Advanced Heart Failure outpatient clinic by Genetics provider Dr. Dobbs regarding her extensive family heart history and for evaluation and determination of appropriate studies. Her mom was diagnosed with CHF in the early 30's, ICD place and  at the age of 41. Currently she is chest pain free, euvolemic and warm on exam.     1) Chest pain  - In clinic EKG   NSR with Sinus Arrhythmia   Minimal voltage criteria for LVH, may be normal variant   Nonspecific t wave abnormality  -Ordered a cMRI  -ordered labs to have drawn today  -Will schedule an apt with Dr. Vaughn after the cMRI.             MDM:4  2 or more stable chronic illnesses  Review of prior external notes from each unique source  Review of the results of each unique test    Orders:  Orders Placed This Encounter   Procedures    MR cardiac morphology and function w and wo IV contrast    Comprehensive Metabolic Panel    B-Type Natriuretic Peptide    CBC    ECG 12 Lead        Followup Appts:  No future appointments.          ____________________________________________________________    LUIS MANUEL Hawthorne-CNP  Outpatient Heart Failure clinic  Division of Cardiovascular Medicine  [facility]         [1] No Known Allergies

## 2025-06-13 LAB
ATRIAL RATE: 67 BPM
P AXIS: 22 DEGREES
P OFFSET: 201 MS
P ONSET: 148 MS
PR INTERVAL: 146 MS
Q ONSET: 221 MS
QRS COUNT: 11 BEATS
QRS DURATION: 88 MS
QT INTERVAL: 400 MS
QTC CALCULATION(BAZETT): 422 MS
QTC FREDERICIA: 415 MS
R AXIS: 3 DEGREES
T AXIS: -10 DEGREES
T OFFSET: 421 MS
VENTRICULAR RATE: 67 BPM

## 2025-06-16 ENCOUNTER — APPOINTMENT (OUTPATIENT)
Dept: CARDIOLOGY | Facility: HOSPITAL | Age: 25
End: 2025-06-16
Payer: COMMERCIAL

## 2025-06-17 LAB
ATRIAL RATE: 67 BPM
P AXIS: 22 DEGREES
P OFFSET: 201 MS
P ONSET: 148 MS
PR INTERVAL: 146 MS
Q ONSET: 221 MS
QRS COUNT: 11 BEATS
QRS DURATION: 88 MS
QT INTERVAL: 400 MS
QTC CALCULATION(BAZETT): 422 MS
QTC FREDERICIA: 415 MS
R AXIS: 3 DEGREES
T AXIS: -10 DEGREES
T OFFSET: 421 MS
VENTRICULAR RATE: 67 BPM

## (undated) DEVICE — MANIFOLD, 4 PORT NEPTUNE STANDARD

## (undated) DEVICE — SYSTEM, FIOS FIRST ENTRY, 5 X 100MM, KII ADVANCED FIXATION

## (undated) DEVICE — CARE KIT, LAPAROSCOPIC, ADVANCED

## (undated) DEVICE — Device

## (undated) DEVICE — LIGASURE, SEALER/DIVIDER MARYLAND JAW, 5MM

## (undated) DEVICE — APPLICATOR, CHLORAPREP, W/ORANGE TINT, 26ML

## (undated) DEVICE — LIGASURE, V SEALER/DIVIDER  5MM BLUNT TIP

## (undated) DEVICE — REST, HEAD, BAGEL, 7 IN

## (undated) DEVICE — GOWN, SURGICAL, SMARTGOWN, XLARGE, STERILE

## (undated) DEVICE — GLOVE, SURGICAL, PROTEXIS,  7.0, PF, LATEX

## (undated) DEVICE — CLIPPER, SURGICAL BLADE ASSEMBLY, GENERAL PURPOSE, SINGLE USE

## (undated) DEVICE — STRIP, SKIN CLOSURE, STERI STRIP, REINFORCED, 0.5 X 4 IN

## (undated) DEVICE — PREP TRAY, SKIN, DRY, W/GLOVES

## (undated) DEVICE — GLOVE, SURGICAL, PROTEXIS,  6.5, PF, LATEX

## (undated) DEVICE — SUTURE, VICRYL, 0, 27 IN, UR-6, VIOLET

## (undated) DEVICE — SUTURE, MONOCRYL, 4-0, 18 IN, PS2, UNDYED

## (undated) DEVICE — COVER, CART, 45 X 27 X 48 IN, CLEAR

## (undated) DEVICE — TROCAR SYSTEM, BALLOON, KII GELPORT, 12 X 100MM

## (undated) DEVICE — SYRINGE, 60 CC, LUER LOCK, MONOJECT, W/O CAP, LF

## (undated) DEVICE — PUMP, STRYKERFLOW 2 & HANDPIECE W/10FT. IRRIGATION TUBING

## (undated) DEVICE — REST, HEAD, BAGEL, 9 IN

## (undated) DEVICE — POSITIONING, THE PINK PAD, PIGAZZI SYSTEM

## (undated) DEVICE — TUBE, SALEM SUMP, 16 FR X 48IN, ENFIT

## (undated) DEVICE — DRESSING, GAUZE, SPONGE, 8 PLY, CURITY, 2 X 2 IN, STERILE

## (undated) DEVICE — BASIN SET, D & C

## (undated) DEVICE — HOLSTER, JET SAFETY

## (undated) DEVICE — TOWEL, SURGICAL, NEURO, O/R, 16 X 26, BLUE, STERILE

## (undated) DEVICE — SUTURE, VICRYL, 0, 3 X 27 IN, CT-1, VIOLET

## (undated) DEVICE — TUBE SET, PNEUMOCLEAR, SMOKE EVACU, HIGH-FLOW

## (undated) DEVICE — ADHESIVE, SKIN, LIQUIBAND EXCEED

## (undated) DEVICE — DRESSING, TRANSPARENT, TEGADERM, 2-3/8 X 2-3/4 IN

## (undated) DEVICE — DRESSING, ADHESIVE, ISLAND, TELFA, 4 X 10 IN

## (undated) DEVICE — RETRIEVAL SYSTEM, MONARCH, 10MM DISP ENDOSCOPIC

## (undated) DEVICE — SUTURE, VICRYL, 4-0, 18 IN, UNDYED BR PS-2